# Patient Record
Sex: MALE | Race: WHITE | NOT HISPANIC OR LATINO | Employment: OTHER | URBAN - METROPOLITAN AREA
[De-identification: names, ages, dates, MRNs, and addresses within clinical notes are randomized per-mention and may not be internally consistent; named-entity substitution may affect disease eponyms.]

---

## 2017-08-31 ENCOUNTER — AMBULATORY - HEALTHEAST (OUTPATIENT)
Dept: CARDIOLOGY | Facility: CLINIC | Age: 67
End: 2017-08-31

## 2017-08-31 DIAGNOSIS — I25.10 CAD (CORONARY ARTERY DISEASE): ICD-10-CM

## 2017-08-31 DIAGNOSIS — I77.810 ECTATIC THORACIC AORTA (H): ICD-10-CM

## 2017-09-22 ENCOUNTER — COMMUNICATION - HEALTHEAST (OUTPATIENT)
Dept: ADMINISTRATIVE | Facility: CLINIC | Age: 67
End: 2017-09-22

## 2017-09-26 ENCOUNTER — COMMUNICATION - HEALTHEAST (OUTPATIENT)
Dept: CARDIOLOGY | Facility: CLINIC | Age: 67
End: 2017-09-26

## 2017-09-26 DIAGNOSIS — I77.810 ECTATIC THORACIC AORTA (H): ICD-10-CM

## 2017-09-29 ENCOUNTER — COMMUNICATION - HEALTHEAST (OUTPATIENT)
Dept: TELEHEALTH | Facility: CLINIC | Age: 67
End: 2017-09-29

## 2017-09-29 ENCOUNTER — HOSPITAL ENCOUNTER (OUTPATIENT)
Dept: CT IMAGING | Facility: HOSPITAL | Age: 67
Discharge: HOME | End: 2017-09-29
Attending: INTERNAL MEDICINE

## 2017-09-29 DIAGNOSIS — I77.810 ECTATIC THORACIC AORTA (H): ICD-10-CM

## 2017-10-26 ENCOUNTER — OFFICE VISIT - HEALTHEAST (OUTPATIENT)
Dept: CARDIOLOGY | Facility: CLINIC | Age: 67
End: 2017-10-26

## 2017-10-26 DIAGNOSIS — I25.10 CORONARY ARTERY CALCIFICATION SEEN ON CAT SCAN: ICD-10-CM

## 2017-10-26 DIAGNOSIS — I77.810 AORTIC ECTASIA, THORACIC (H): ICD-10-CM

## 2017-10-26 DIAGNOSIS — I10 ESSENTIAL HYPERTENSION: ICD-10-CM

## 2017-10-26 DIAGNOSIS — E78.00 HYPERCHOLESTEROLEMIA: ICD-10-CM

## 2017-10-26 DIAGNOSIS — I35.1 MILD AORTIC INSUFFICIENCY: ICD-10-CM

## 2017-10-26 ASSESSMENT — MIFFLIN-ST. JEOR: SCORE: 1627.16

## 2017-11-09 ENCOUNTER — RECORDS - HEALTHEAST (OUTPATIENT)
Dept: LAB | Facility: CLINIC | Age: 67
End: 2017-11-09

## 2017-11-09 LAB
CHOLEST SERPL-MCNC: 156 MG/DL
FASTING STATUS PATIENT QL REPORTED: NO
HDLC SERPL-MCNC: 56 MG/DL
LDLC SERPL CALC-MCNC: 82 MG/DL
TRIGL SERPL-MCNC: 89 MG/DL

## 2018-04-02 ENCOUNTER — RECORDS - HEALTHEAST (OUTPATIENT)
Dept: LAB | Facility: CLINIC | Age: 68
End: 2018-04-02

## 2018-04-02 LAB
ANION GAP SERPL CALCULATED.3IONS-SCNC: 11 MMOL/L (ref 5–18)
BUN SERPL-MCNC: 24 MG/DL (ref 8–22)
CALCIUM SERPL-MCNC: 8.9 MG/DL (ref 8.5–10.5)
CHLORIDE BLD-SCNC: 106 MMOL/L (ref 98–107)
CO2 SERPL-SCNC: 24 MMOL/L (ref 22–31)
CREAT SERPL-MCNC: 1.06 MG/DL (ref 0.7–1.3)
GFR SERPL CREATININE-BSD FRML MDRD: >60 ML/MIN/1.73M2
GLUCOSE BLD-MCNC: 95 MG/DL (ref 70–125)
POTASSIUM BLD-SCNC: 3.7 MMOL/L (ref 3.5–5)
SODIUM SERPL-SCNC: 141 MMOL/L (ref 136–145)

## 2018-05-09 ENCOUNTER — RECORDS - HEALTHEAST (OUTPATIENT)
Dept: LAB | Facility: CLINIC | Age: 68
End: 2018-05-09

## 2018-06-01 LAB — BACTERIA SPEC CULT: ABNORMAL

## 2018-09-19 ENCOUNTER — AMBULATORY - HEALTHEAST (OUTPATIENT)
Dept: CARDIOLOGY | Facility: CLINIC | Age: 68
End: 2018-09-19

## 2018-09-19 DIAGNOSIS — I77.810 ECTATIC THORACIC AORTA (H): ICD-10-CM

## 2018-10-03 ENCOUNTER — HOSPITAL ENCOUNTER (OUTPATIENT)
Dept: CT IMAGING | Facility: HOSPITAL | Age: 68
Discharge: HOME | End: 2018-10-03
Attending: INTERNAL MEDICINE

## 2018-10-03 DIAGNOSIS — I77.810 ECTATIC THORACIC AORTA (H): ICD-10-CM

## 2018-10-03 LAB
CREAT BLD-MCNC: 1.1 MG/DL
POC GFR AMER AF HE - HISTORICAL: >60 ML/MIN/1.73M2
POC GFR NON AMER AF HE - HISTORICAL: >60 ML/MIN/1.73M2

## 2018-10-10 ENCOUNTER — AMBULATORY - HEALTHEAST (OUTPATIENT)
Dept: CARDIOLOGY | Facility: CLINIC | Age: 68
End: 2018-10-10

## 2018-10-10 DIAGNOSIS — I71.21 ASCENDING AORTIC ANEURYSM (H): ICD-10-CM

## 2018-11-29 ENCOUNTER — RECORDS - HEALTHEAST (OUTPATIENT)
Dept: ADMINISTRATIVE | Facility: OTHER | Age: 68
End: 2018-11-29

## 2018-12-03 ENCOUNTER — OFFICE VISIT - HEALTHEAST (OUTPATIENT)
Dept: CARDIOLOGY | Facility: CLINIC | Age: 68
End: 2018-12-03

## 2018-12-03 DIAGNOSIS — E78.00 HYPERCHOLESTEROLEMIA: ICD-10-CM

## 2018-12-03 DIAGNOSIS — I25.10 CORONARY ARTERY CALCIFICATION SEEN ON CAT SCAN: ICD-10-CM

## 2018-12-03 DIAGNOSIS — I10 ESSENTIAL HYPERTENSION: ICD-10-CM

## 2018-12-03 DIAGNOSIS — I77.810 AORTIC ECTASIA, THORACIC (H): ICD-10-CM

## 2018-12-03 LAB
CHOLEST SERPL-MCNC: 147 MG/DL
FASTING STATUS PATIENT QL REPORTED: ABNORMAL
HDLC SERPL-MCNC: 55 MG/DL
LDLC SERPL CALC-MCNC: 60 MG/DL
TRIGL SERPL-MCNC: 162 MG/DL

## 2018-12-03 ASSESSMENT — MIFFLIN-ST. JEOR: SCORE: 1640.76

## 2019-04-17 ENCOUNTER — RECORDS - HEALTHEAST (OUTPATIENT)
Dept: ADMINISTRATIVE | Facility: OTHER | Age: 69
End: 2019-04-17

## 2019-04-17 ENCOUNTER — RECORDS - HEALTHEAST (OUTPATIENT)
Dept: LAB | Facility: CLINIC | Age: 69
End: 2019-04-17

## 2019-04-17 LAB
ALBUMIN SERPL-MCNC: 4.1 G/DL (ref 3.5–5)
ALP SERPL-CCNC: 61 U/L (ref 45–120)
ALT SERPL W P-5'-P-CCNC: 24 U/L (ref 0–45)
ANION GAP SERPL CALCULATED.3IONS-SCNC: 10 MMOL/L (ref 5–18)
AST SERPL W P-5'-P-CCNC: 26 U/L (ref 0–40)
BILIRUB SERPL-MCNC: 1.3 MG/DL (ref 0–1)
BUN SERPL-MCNC: 17 MG/DL (ref 8–28)
CALCIUM SERPL-MCNC: 9.6 MG/DL (ref 8.5–10.5)
CHLORIDE BLD-SCNC: 104 MMOL/L (ref 98–107)
CHOLEST SERPL-MCNC: 146 MG/DL
CO2 SERPL-SCNC: 26 MMOL/L (ref 22–31)
CREAT SERPL-MCNC: 1 MG/DL (ref 0.7–1.3)
FASTING STATUS PATIENT QL REPORTED: YES
GFR SERPL CREATININE-BSD FRML MDRD: >60 ML/MIN/1.73M2
GLUCOSE BLD-MCNC: 90 MG/DL (ref 70–125)
HDLC SERPL-MCNC: 61 MG/DL
LDLC SERPL CALC-MCNC: 68 MG/DL
POTASSIUM BLD-SCNC: 3.5 MMOL/L (ref 3.5–5)
PROT SERPL-MCNC: 6.7 G/DL (ref 6–8)
SODIUM SERPL-SCNC: 140 MMOL/L (ref 136–145)
TRIGL SERPL-MCNC: 85 MG/DL

## 2019-07-21 ENCOUNTER — RECORDS - HEALTHEAST (OUTPATIENT)
Dept: ADMINISTRATIVE | Facility: OTHER | Age: 69
End: 2019-07-21

## 2019-07-29 ENCOUNTER — RECORDS - HEALTHEAST (OUTPATIENT)
Dept: ADMINISTRATIVE | Facility: OTHER | Age: 69
End: 2019-07-29

## 2019-09-09 ENCOUNTER — RECORDS - HEALTHEAST (OUTPATIENT)
Dept: ADMINISTRATIVE | Facility: OTHER | Age: 69
End: 2019-09-09

## 2019-11-19 ENCOUNTER — RECORDS - HEALTHEAST (OUTPATIENT)
Dept: ADMINISTRATIVE | Facility: OTHER | Age: 69
End: 2019-11-19

## 2019-11-19 ENCOUNTER — RECORDS - HEALTHEAST (OUTPATIENT)
Dept: LAB | Facility: CLINIC | Age: 69
End: 2019-11-19

## 2019-11-19 LAB
ANION GAP SERPL CALCULATED.3IONS-SCNC: 8 MMOL/L (ref 5–18)
BUN SERPL-MCNC: 17 MG/DL (ref 8–28)
CALCIUM SERPL-MCNC: 9.4 MG/DL (ref 8.5–10.5)
CHLORIDE BLD-SCNC: 103 MMOL/L (ref 98–107)
CO2 SERPL-SCNC: 30 MMOL/L (ref 22–31)
CREAT SERPL-MCNC: 1.09 MG/DL (ref 0.7–1.3)
GFR SERPL CREATININE-BSD FRML MDRD: >60 ML/MIN/1.73M2
GLUCOSE BLD-MCNC: 156 MG/DL (ref 70–125)
POTASSIUM BLD-SCNC: 3.2 MMOL/L (ref 3.5–5)
SODIUM SERPL-SCNC: 141 MMOL/L (ref 136–145)

## 2019-12-04 ENCOUNTER — COMMUNICATION - HEALTHEAST (OUTPATIENT)
Dept: CARDIOLOGY | Facility: CLINIC | Age: 69
End: 2019-12-04

## 2019-12-04 DIAGNOSIS — I35.9 AORTIC VALVE DISEASE: ICD-10-CM

## 2019-12-04 DIAGNOSIS — I77.810 AORTIC ECTASIA, THORACIC (H): ICD-10-CM

## 2019-12-17 ENCOUNTER — HOSPITAL ENCOUNTER (OUTPATIENT)
Dept: CT IMAGING | Facility: HOSPITAL | Age: 69
Discharge: HOME | End: 2019-12-17
Attending: INTERNAL MEDICINE

## 2019-12-17 DIAGNOSIS — I77.810 AORTIC ECTASIA, THORACIC (H): ICD-10-CM

## 2019-12-17 DIAGNOSIS — I35.9 AORTIC VALVE DISEASE: ICD-10-CM

## 2019-12-31 ENCOUNTER — RECORDS - HEALTHEAST (OUTPATIENT)
Dept: LAB | Facility: CLINIC | Age: 69
End: 2019-12-31

## 2020-01-03 LAB
BACTERIA SPEC CULT: ABNORMAL
BACTERIA SPEC CULT: NORMAL
GRAM STAIN RESULT: NORMAL
GRAM STAIN RESULT: NORMAL

## 2020-01-07 ENCOUNTER — AMBULATORY - HEALTHEAST (OUTPATIENT)
Dept: CARDIOLOGY | Facility: CLINIC | Age: 70
End: 2020-01-07

## 2020-01-07 ENCOUNTER — RECORDS - HEALTHEAST (OUTPATIENT)
Dept: ADMINISTRATIVE | Facility: OTHER | Age: 70
End: 2020-01-07

## 2020-01-22 ENCOUNTER — OFFICE VISIT - HEALTHEAST (OUTPATIENT)
Dept: CARDIOLOGY | Facility: CLINIC | Age: 70
End: 2020-01-22

## 2020-01-22 DIAGNOSIS — I77.810 AORTIC ECTASIA, THORACIC (H): ICD-10-CM

## 2020-01-22 DIAGNOSIS — E78.00 HYPERCHOLESTEROLEMIA: ICD-10-CM

## 2020-01-22 DIAGNOSIS — I35.1 MILD AORTIC INSUFFICIENCY: ICD-10-CM

## 2020-01-22 DIAGNOSIS — I10 ESSENTIAL HYPERTENSION: ICD-10-CM

## 2020-01-22 DIAGNOSIS — I25.10 CORONARY ARTERY CALCIFICATION SEEN ON CAT SCAN: ICD-10-CM

## 2020-01-22 ASSESSMENT — MIFFLIN-ST. JEOR: SCORE: 1654.37

## 2020-06-03 ENCOUNTER — AMBULATORY - HEALTHEAST (OUTPATIENT)
Dept: VASCULAR SURGERY | Facility: CLINIC | Age: 70
End: 2020-06-03

## 2020-06-03 DIAGNOSIS — M79.89 LEG SWELLING: ICD-10-CM

## 2020-06-29 ENCOUNTER — RECORDS - HEALTHEAST (OUTPATIENT)
Dept: LAB | Facility: CLINIC | Age: 70
End: 2020-06-29

## 2020-06-29 LAB
ALBUMIN SERPL-MCNC: 3.9 G/DL (ref 3.5–5)
ALP SERPL-CCNC: 68 U/L (ref 45–120)
ALT SERPL W P-5'-P-CCNC: 27 U/L (ref 0–45)
ANION GAP SERPL CALCULATED.3IONS-SCNC: 11 MMOL/L (ref 5–18)
AST SERPL W P-5'-P-CCNC: 23 U/L (ref 0–40)
BILIRUB SERPL-MCNC: 0.9 MG/DL (ref 0–1)
BUN SERPL-MCNC: 20 MG/DL (ref 8–28)
CALCIUM SERPL-MCNC: 9.6 MG/DL (ref 8.5–10.5)
CHLORIDE BLD-SCNC: 104 MMOL/L (ref 98–107)
CO2 SERPL-SCNC: 28 MMOL/L (ref 22–31)
CREAT SERPL-MCNC: 1 MG/DL (ref 0.7–1.3)
GFR SERPL CREATININE-BSD FRML MDRD: >60 ML/MIN/1.73M2
GLUCOSE BLD-MCNC: 104 MG/DL (ref 70–125)
POTASSIUM BLD-SCNC: 3.2 MMOL/L (ref 3.5–5)
PROT SERPL-MCNC: 6.5 G/DL (ref 6–8)
SODIUM SERPL-SCNC: 143 MMOL/L (ref 136–145)

## 2020-06-30 ENCOUNTER — OFFICE VISIT - HEALTHEAST (OUTPATIENT)
Dept: VASCULAR SURGERY | Facility: CLINIC | Age: 70
End: 2020-06-30

## 2020-06-30 ENCOUNTER — COMMUNICATION - HEALTHEAST (OUTPATIENT)
Dept: VASCULAR SURGERY | Facility: CLINIC | Age: 70
End: 2020-06-30

## 2020-06-30 DIAGNOSIS — R59.0 INGUINAL LYMPHADENOPATHY: ICD-10-CM

## 2020-06-30 DIAGNOSIS — L90.5 SCAR CONDITION AND FIBROSIS OF SKIN: ICD-10-CM

## 2020-06-30 DIAGNOSIS — I87.303 VENOUS HYPERTENSION OF LOWER EXTREMITY, BILATERAL: ICD-10-CM

## 2020-06-30 DIAGNOSIS — I89.0 ACQUIRED LYMPHEDEMA OF LEG: ICD-10-CM

## 2020-06-30 DIAGNOSIS — C61 PROSTATE CANCER (H): ICD-10-CM

## 2020-06-30 DIAGNOSIS — M79.89 LEG SWELLING: ICD-10-CM

## 2020-06-30 DIAGNOSIS — Q66.70 HIGH ARCHES, CONGENITAL: ICD-10-CM

## 2020-07-06 ENCOUNTER — COMMUNICATION - HEALTHEAST (OUTPATIENT)
Dept: VASCULAR SURGERY | Facility: CLINIC | Age: 70
End: 2020-07-06

## 2020-07-08 ENCOUNTER — COMMUNICATION - HEALTHEAST (OUTPATIENT)
Dept: OTHER | Facility: CLINIC | Age: 70
End: 2020-07-08

## 2020-07-08 ENCOUNTER — AMBULATORY - HEALTHEAST (OUTPATIENT)
Dept: VASCULAR SURGERY | Facility: CLINIC | Age: 70
End: 2020-07-08

## 2020-07-08 ENCOUNTER — RECORDS - HEALTHEAST (OUTPATIENT)
Dept: VASCULAR ULTRASOUND | Facility: CLINIC | Age: 70
End: 2020-07-08

## 2020-07-08 DIAGNOSIS — Q66.70 CONGENITAL CAVUS DEFORMITY OF FOOT: ICD-10-CM

## 2020-07-08 DIAGNOSIS — C61 PROSTATE CANCER (H): ICD-10-CM

## 2020-07-08 DIAGNOSIS — L90.5 SCAR CONDITION AND FIBROSIS OF SKIN: ICD-10-CM

## 2020-07-08 DIAGNOSIS — R59.0 INGUINAL LYMPHADENOPATHY: ICD-10-CM

## 2020-07-08 DIAGNOSIS — I87.303 VENOUS HYPERTENSION OF LOWER EXTREMITY, BILATERAL: ICD-10-CM

## 2020-07-08 DIAGNOSIS — L90.5 SCAR CONDITIONS AND FIBROSIS OF SKIN: ICD-10-CM

## 2020-07-08 DIAGNOSIS — R59.0 LOCALIZED ENLARGED LYMPH NODES: ICD-10-CM

## 2020-07-08 DIAGNOSIS — C61 MALIGNANT NEOPLASM OF PROSTATE (H): ICD-10-CM

## 2020-07-08 DIAGNOSIS — I89.0 ACQUIRED LYMPHEDEMA OF LEG: ICD-10-CM

## 2020-07-08 DIAGNOSIS — I87.303 CHRONIC VENOUS HYPERTENSION (IDIOPATHIC) WITHOUT COMPLICATIONS OF BILATERAL LOWER EXTREMITY: ICD-10-CM

## 2020-07-08 DIAGNOSIS — M79.89 OTHER SPECIFIED SOFT TISSUE DISORDERS: ICD-10-CM

## 2020-07-08 DIAGNOSIS — I89.0 LYMPHEDEMA, NOT ELSEWHERE CLASSIFIED: ICD-10-CM

## 2020-07-08 DIAGNOSIS — M79.89 LEG SWELLING: ICD-10-CM

## 2020-07-08 DIAGNOSIS — Q66.70 HIGH ARCHES, CONGENITAL: ICD-10-CM

## 2020-07-09 ENCOUNTER — COMMUNICATION - HEALTHEAST (OUTPATIENT)
Dept: VASCULAR SURGERY | Facility: CLINIC | Age: 70
End: 2020-07-09

## 2020-07-15 ENCOUNTER — COMMUNICATION - HEALTHEAST (OUTPATIENT)
Dept: VASCULAR SURGERY | Facility: CLINIC | Age: 70
End: 2020-07-15

## 2020-07-22 ENCOUNTER — OFFICE VISIT - HEALTHEAST (OUTPATIENT)
Dept: PHYSICAL THERAPY | Facility: REHABILITATION | Age: 70
End: 2020-07-22

## 2020-07-22 DIAGNOSIS — I89.0 ACQUIRED LYMPHEDEMA OF LEG: ICD-10-CM

## 2020-07-22 DIAGNOSIS — I87.303 VENOUS HYPERTENSION OF LOWER EXTREMITY, BILATERAL: ICD-10-CM

## 2020-07-22 DIAGNOSIS — C61 PROSTATE CANCER (H): ICD-10-CM

## 2020-07-22 DIAGNOSIS — L90.5 SCAR CONDITION AND FIBROSIS OF SKIN: ICD-10-CM

## 2020-07-23 ENCOUNTER — OFFICE VISIT - HEALTHEAST (OUTPATIENT)
Dept: PHYSICAL THERAPY | Facility: REHABILITATION | Age: 70
End: 2020-07-23

## 2020-07-23 DIAGNOSIS — L90.5 SCAR CONDITION AND FIBROSIS OF SKIN: ICD-10-CM

## 2020-07-23 DIAGNOSIS — I87.303 VENOUS HYPERTENSION OF LOWER EXTREMITY, BILATERAL: ICD-10-CM

## 2020-07-23 DIAGNOSIS — C61 PROSTATE CANCER (H): ICD-10-CM

## 2020-07-23 DIAGNOSIS — I89.0 ACQUIRED LYMPHEDEMA OF LEG: ICD-10-CM

## 2020-07-29 ENCOUNTER — OFFICE VISIT - HEALTHEAST (OUTPATIENT)
Dept: PHYSICAL THERAPY | Facility: REHABILITATION | Age: 70
End: 2020-07-29

## 2020-07-29 DIAGNOSIS — I89.0 ACQUIRED LYMPHEDEMA OF LEG: ICD-10-CM

## 2020-07-29 DIAGNOSIS — L90.5 SCAR CONDITION AND FIBROSIS OF SKIN: ICD-10-CM

## 2020-07-29 DIAGNOSIS — I87.303 VENOUS HYPERTENSION OF LOWER EXTREMITY, BILATERAL: ICD-10-CM

## 2020-07-29 DIAGNOSIS — C61 PROSTATE CANCER (H): ICD-10-CM

## 2020-08-05 ENCOUNTER — OFFICE VISIT - HEALTHEAST (OUTPATIENT)
Dept: PHYSICAL THERAPY | Facility: REHABILITATION | Age: 70
End: 2020-08-05

## 2020-08-05 ENCOUNTER — AMBULATORY - HEALTHEAST (OUTPATIENT)
Dept: OTHER | Facility: CLINIC | Age: 70
End: 2020-08-05

## 2020-08-05 DIAGNOSIS — C61 PROSTATE CANCER (H): ICD-10-CM

## 2020-08-05 DIAGNOSIS — I87.303 VENOUS HYPERTENSION OF LOWER EXTREMITY, BILATERAL: ICD-10-CM

## 2020-08-05 DIAGNOSIS — I89.0 ACQUIRED LYMPHEDEMA OF LEG: ICD-10-CM

## 2020-08-05 DIAGNOSIS — L90.5 SCAR CONDITION AND FIBROSIS OF SKIN: ICD-10-CM

## 2020-08-11 ENCOUNTER — OFFICE VISIT - HEALTHEAST (OUTPATIENT)
Dept: PHYSICAL THERAPY | Facility: REHABILITATION | Age: 70
End: 2020-08-11

## 2020-08-11 DIAGNOSIS — I87.303 VENOUS HYPERTENSION OF LOWER EXTREMITY, BILATERAL: ICD-10-CM

## 2020-08-11 DIAGNOSIS — I89.0 ACQUIRED LYMPHEDEMA OF LEG: ICD-10-CM

## 2020-08-11 DIAGNOSIS — L90.5 SCAR CONDITION AND FIBROSIS OF SKIN: ICD-10-CM

## 2020-08-11 DIAGNOSIS — C61 PROSTATE CANCER (H): ICD-10-CM

## 2020-08-13 ENCOUNTER — RECORDS - HEALTHEAST (OUTPATIENT)
Dept: LAB | Facility: CLINIC | Age: 70
End: 2020-08-13

## 2020-08-13 LAB
ANION GAP SERPL CALCULATED.3IONS-SCNC: 9 MMOL/L (ref 5–18)
BUN SERPL-MCNC: 15 MG/DL (ref 8–28)
CALCIUM SERPL-MCNC: 9.2 MG/DL (ref 8.5–10.5)
CHLORIDE BLD-SCNC: 106 MMOL/L (ref 98–107)
CO2 SERPL-SCNC: 27 MMOL/L (ref 22–31)
CREAT SERPL-MCNC: 0.93 MG/DL (ref 0.7–1.3)
GFR SERPL CREATININE-BSD FRML MDRD: >60 ML/MIN/1.73M2
GLUCOSE BLD-MCNC: 104 MG/DL (ref 70–125)
POTASSIUM BLD-SCNC: 3.9 MMOL/L (ref 3.5–5)
SODIUM SERPL-SCNC: 142 MMOL/L (ref 136–145)

## 2020-10-01 ENCOUNTER — OFFICE VISIT - HEALTHEAST (OUTPATIENT)
Dept: VASCULAR SURGERY | Facility: CLINIC | Age: 70
End: 2020-10-01

## 2020-10-01 DIAGNOSIS — I89.0 ACQUIRED LYMPHEDEMA OF LEG: ICD-10-CM

## 2020-10-01 DIAGNOSIS — M79.89 LEG SWELLING: ICD-10-CM

## 2020-10-01 DIAGNOSIS — C61 PROSTATE CANCER (H): ICD-10-CM

## 2020-10-01 DIAGNOSIS — L90.5 SCAR CONDITION AND FIBROSIS OF SKIN: ICD-10-CM

## 2020-10-01 RX ORDER — MELOXICAM 7.5 MG/1
1 TABLET ORAL DAILY
Status: SHIPPED | COMMUNITY
Start: 2020-07-28 | End: 2022-04-19

## 2020-10-01 RX ORDER — AMLODIPINE AND OLMESARTAN MEDOXOMIL 10; 40 MG/1; MG/1
1 TABLET ORAL DAILY
Status: SHIPPED | COMMUNITY
Start: 2020-09-07

## 2020-10-01 ASSESSMENT — MIFFLIN-ST. JEOR: SCORE: 1627.16

## 2020-12-17 ENCOUNTER — AMBULATORY - HEALTHEAST (OUTPATIENT)
Dept: CARDIOLOGY | Facility: CLINIC | Age: 70
End: 2020-12-17

## 2020-12-17 DIAGNOSIS — I35.9 AORTIC VALVE DISEASE: ICD-10-CM

## 2020-12-17 DIAGNOSIS — I77.810 AORTIC ECTASIA, THORACIC (H): ICD-10-CM

## 2021-01-11 ENCOUNTER — HOSPITAL ENCOUNTER (OUTPATIENT)
Dept: CT IMAGING | Facility: HOSPITAL | Age: 71
Discharge: HOME | End: 2021-01-11
Attending: INTERNAL MEDICINE

## 2021-01-11 DIAGNOSIS — I35.9 AORTIC VALVE DISEASE: ICD-10-CM

## 2021-01-11 DIAGNOSIS — I77.810 AORTIC ECTASIA, THORACIC (H): ICD-10-CM

## 2021-01-11 LAB
CREAT BLD-MCNC: 1.1 MG/DL (ref 0.7–1.3)
GFR SERPL CREATININE-BSD FRML MDRD: >60 ML/MIN/1.73M2

## 2021-01-20 ENCOUNTER — AMBULATORY - HEALTHEAST (OUTPATIENT)
Dept: CARDIOLOGY | Facility: CLINIC | Age: 71
End: 2021-01-20

## 2021-01-20 ENCOUNTER — RECORDS - HEALTHEAST (OUTPATIENT)
Dept: ADMINISTRATIVE | Facility: OTHER | Age: 71
End: 2021-01-20

## 2021-01-22 ENCOUNTER — HOSPITAL ENCOUNTER (OUTPATIENT)
Dept: CARDIOLOGY | Facility: CLINIC | Age: 71
Discharge: HOME | End: 2021-01-22
Attending: INTERNAL MEDICINE

## 2021-01-22 DIAGNOSIS — I77.810 AORTIC ECTASIA, THORACIC (H): ICD-10-CM

## 2021-01-22 DIAGNOSIS — I35.9 AORTIC VALVE DISEASE: ICD-10-CM

## 2021-01-22 LAB
AORTIC ROOT: 3.9 CM
AORTIC VALVE MEAN VELOCITY: 94.5 CM/S
ASCENDING AORTA: 3.8 CM
AV DIMENSIONLESS INDEX VTI: 0.7
AV MEAN GRADIENT: 4 MMHG
AV PEAK GRADIENT: 7.5 MMHG
AV VALVE AREA: 2.6 CM2
AV VELOCITY RATIO: 0.8
BSA FOR ECHO PROCEDURE: 2.07 M2
CV BLOOD PRESSURE: ABNORMAL MMHG
CV ECHO HEIGHT: 70 IN
CV ECHO WEIGHT: 192 LBS
DOP CALC AO PEAK VEL: 137 CM/S
DOP CALC AO VTI: 28.6 CM
DOP CALC LVOT AREA: 3.8 CM2
DOP CALC LVOT DIAMETER: 2.2 CM
DOP CALC LVOT PEAK VEL: 115 CM/S
DOP CALC LVOT STROKE VOLUME: 75.6 CM3
DOP CALCLVOT PEAK VEL VTI: 19.9 CM
EJECTION FRACTION: 64 % (ref 55–75)
FRACTIONAL SHORTENING: 41.8 % (ref 28–44)
INTERVENTRICULAR SEPTUM IN END DIASTOLE: 1.57 CM (ref 0.6–1)
IVS/PW RATIO: 1.4
LA AREA 1: 15 CM2
LA AREA 2: 11 CM2
LEFT ATRIUM LENGTH: 3.7 CM
LEFT ATRIUM SIZE: 3.8 CM
LEFT ATRIUM TO AORTIC ROOT RATIO: 0.97 NO UNITS
LEFT ATRIUM VOLUME INDEX: 18.3 ML/M2
LEFT ATRIUM VOLUME: 37.9 ML
LEFT VENTRICLE CARDIAC INDEX: 2.1 L/MIN/M2
LEFT VENTRICLE CARDIAC OUTPUT: 4.4 L/MIN
LEFT VENTRICLE DIASTOLIC VOLUME INDEX: 64.3 CM3/M2 (ref 34–74)
LEFT VENTRICLE DIASTOLIC VOLUME: 133 CM3 (ref 62–150)
LEFT VENTRICLE HEART RATE: 58 BPM
LEFT VENTRICLE MASS INDEX: 101.2 G/M2
LEFT VENTRICLE SYSTOLIC VOLUME INDEX: 23 CM3/M2 (ref 11–31)
LEFT VENTRICLE SYSTOLIC VOLUME: 47.7 CM3 (ref 21–61)
LEFT VENTRICULAR INTERNAL DIMENSION IN DIASTOLE: 4.21 CM (ref 4.2–5.8)
LEFT VENTRICULAR INTERNAL DIMENSION IN SYSTOLE: 2.45 CM (ref 2.5–4)
LEFT VENTRICULAR MASS: 209.5 G
LEFT VENTRICULAR OUTFLOW TRACT MEAN GRADIENT: 2 MMHG
LEFT VENTRICULAR OUTFLOW TRACT MEAN VELOCITY: 60 CM/S
LEFT VENTRICULAR OUTFLOW TRACT PEAK GRADIENT: 5 MMHG
LEFT VENTRICULAR POSTERIOR WALL IN END DIASTOLE: 1.1 CM (ref 0.6–1)
LV STROKE VOLUME INDEX: 36.5 ML/M2
MITRAL VALVE E/A RATIO: 0.7
MV AVERAGE E/E' RATIO: 6.7 CM/S
MV DECELERATION TIME: 306 MS
MV E'TISSUE VEL-LAT: 9.57 CM/S
MV E'TISSUE VEL-MED: 7.45 CM/S
MV LATERAL E/E' RATIO: 5.9
MV MEDIAL E/E' RATIO: 7.6
MV PEAK A VELOCITY: 87.5 CM/S
MV PEAK E VELOCITY: 56.9 CM/S
NUC REST DIASTOLIC VOLUME INDEX: 3072 LBS
NUC REST SYSTOLIC VOLUME INDEX: 70 IN
PV MEAN GRADIENT: 4 MMHG
PV MEAN VELOCITY: 97.5 CM/S
PV PEAK GRADIENT: 8 MMHG
PV VELOCITY TIME INTERVAL: 30.7 CM
PV VMAX: 141 CM/S
TRICUSPID REGURGITATION PEAK PRESSURE GRADIENT: 27.5 MMHG
TRICUSPID VALVE ANULAR PLANE SYSTOLIC EXCURSION: 3.2 CM
TRICUSPID VALVE PEAK REGURGITANT VELOCITY: 262 CM/S

## 2021-01-22 ASSESSMENT — MIFFLIN-ST. JEOR: SCORE: 1627.16

## 2021-01-25 ENCOUNTER — COMMUNICATION - HEALTHEAST (OUTPATIENT)
Dept: CARDIOLOGY | Facility: CLINIC | Age: 71
End: 2021-01-25

## 2021-01-26 ENCOUNTER — OFFICE VISIT - HEALTHEAST (OUTPATIENT)
Dept: CARDIOLOGY | Facility: CLINIC | Age: 71
End: 2021-01-26

## 2021-01-26 DIAGNOSIS — I10 ESSENTIAL HYPERTENSION: ICD-10-CM

## 2021-01-26 DIAGNOSIS — I25.10 CORONARY ARTERY CALCIFICATION SEEN ON CAT SCAN: ICD-10-CM

## 2021-01-26 DIAGNOSIS — I35.1 MILD AORTIC INSUFFICIENCY: ICD-10-CM

## 2021-01-26 DIAGNOSIS — I77.810 AORTIC ECTASIA, THORACIC (H): ICD-10-CM

## 2021-01-26 DIAGNOSIS — E78.00 HYPERCHOLESTEROLEMIA: ICD-10-CM

## 2021-01-26 LAB
CHOLEST SERPL-MCNC: 148 MG/DL
FASTING STATUS PATIENT QL REPORTED: NO
HDLC SERPL-MCNC: 59 MG/DL
LDLC SERPL CALC-MCNC: 48 MG/DL
TRIGL SERPL-MCNC: 204 MG/DL

## 2021-01-26 RX ORDER — TERBINAFINE HYDROCHLORIDE 250 MG/1
250 TABLET ORAL
Status: SHIPPED | COMMUNITY
Start: 2021-01-26 | End: 2022-02-15

## 2021-01-26 RX ORDER — VALACYCLOVIR HYDROCHLORIDE 1 G/1
TABLET, FILM COATED ORAL
Status: SHIPPED | COMMUNITY
Start: 2020-10-19 | End: 2022-02-15

## 2021-01-26 ASSESSMENT — MIFFLIN-ST. JEOR: SCORE: 1636.23

## 2021-01-29 ENCOUNTER — COMMUNICATION - HEALTHEAST (OUTPATIENT)
Dept: PHYSICAL THERAPY | Facility: REHABILITATION | Age: 71
End: 2021-01-29

## 2021-04-21 ENCOUNTER — OFFICE VISIT - HEALTHEAST (OUTPATIENT)
Dept: VASCULAR SURGERY | Facility: CLINIC | Age: 71
End: 2021-04-21

## 2021-04-21 DIAGNOSIS — C61 PROSTATE CANCER (H): ICD-10-CM

## 2021-04-21 DIAGNOSIS — Q66.70 HIGH ARCHES, CONGENITAL: ICD-10-CM

## 2021-04-21 DIAGNOSIS — I89.0 ACQUIRED LYMPHEDEMA OF LEG: ICD-10-CM

## 2021-04-21 DIAGNOSIS — I87.303 VENOUS HYPERTENSION OF LOWER EXTREMITY, BILATERAL: ICD-10-CM

## 2021-04-21 DIAGNOSIS — M79.89 LEG SWELLING: ICD-10-CM

## 2021-04-21 ASSESSMENT — MIFFLIN-ST. JEOR: SCORE: 1629.43

## 2021-05-31 VITALS — WEIGHT: 192 LBS | BODY MASS INDEX: 27.49 KG/M2 | HEIGHT: 70 IN

## 2021-06-02 VITALS — HEIGHT: 70 IN | WEIGHT: 195 LBS | BODY MASS INDEX: 27.92 KG/M2

## 2021-06-04 VITALS
HEIGHT: 70 IN | BODY MASS INDEX: 28.35 KG/M2 | HEART RATE: 72 BPM | DIASTOLIC BLOOD PRESSURE: 70 MMHG | OXYGEN SATURATION: 14 % | SYSTOLIC BLOOD PRESSURE: 106 MMHG | WEIGHT: 198 LBS

## 2021-06-04 VITALS
WEIGHT: 189.3 LBS | DIASTOLIC BLOOD PRESSURE: 70 MMHG | OXYGEN SATURATION: 97 % | BODY MASS INDEX: 27.16 KG/M2 | TEMPERATURE: 98.2 F | HEART RATE: 62 BPM | RESPIRATION RATE: 16 BRPM | SYSTOLIC BLOOD PRESSURE: 110 MMHG

## 2021-06-04 NOTE — TELEPHONE ENCOUNTER
===View-only below this line===  ----- Message -----  From: Nayana Gomes MD  Sent: 12/4/2019   2:01 PM CST  To: Gelacio Blanco RN    Yes, CTA chest for aneurysm f/u please.    Thanks,   EG

## 2021-06-04 NOTE — TELEPHONE ENCOUNTER
"Dr. Gomes last OV, 12/3/2018 states, \"Aortic ectasia/aneurysm - CTA in 1 year\". Patient has arranged an OV with you for 1/10/20. Would you like testing completed prior to OV? Please give recommendation. EMARN  "

## 2021-06-04 NOTE — TELEPHONE ENCOUNTER
----- Message from Gaurang Oconnell sent at 12/4/2019 10:01 AM CST -----  Regarding: order needed CTA  General phone call:    Caller: Pt    Primary cardiologist: Dr Hewitt    Detailed reason for call: Pt is to have 1 YR FU - per notes Pt will need testing - perhaps a CTA?    Please place proper order and let me know so we can schedule prior to FU - thank you     New or active symptoms? na  Best phone number: home  Best time to contact: na  Ok to leave a detailed message? na  Device? na    Additional Info:

## 2021-06-05 ENCOUNTER — RECORDS - HEALTHEAST (OUTPATIENT)
Dept: CARDIOLOGY | Facility: CLINIC | Age: 71
End: 2021-06-05

## 2021-06-05 VITALS
RESPIRATION RATE: 20 BRPM | BODY MASS INDEX: 27.49 KG/M2 | DIASTOLIC BLOOD PRESSURE: 82 MMHG | HEIGHT: 70 IN | HEART RATE: 68 BPM | WEIGHT: 192 LBS | SYSTOLIC BLOOD PRESSURE: 118 MMHG | TEMPERATURE: 98.2 F

## 2021-06-05 VITALS
DIASTOLIC BLOOD PRESSURE: 80 MMHG | HEIGHT: 70 IN | SYSTOLIC BLOOD PRESSURE: 130 MMHG | RESPIRATION RATE: 16 BRPM | WEIGHT: 194 LBS | OXYGEN SATURATION: 97 % | HEART RATE: 60 BPM | BODY MASS INDEX: 27.77 KG/M2

## 2021-06-05 VITALS
SYSTOLIC BLOOD PRESSURE: 106 MMHG | DIASTOLIC BLOOD PRESSURE: 80 MMHG | HEART RATE: 68 BPM | TEMPERATURE: 97.9 F | BODY MASS INDEX: 26.46 KG/M2 | WEIGHT: 189 LBS | RESPIRATION RATE: 22 BRPM | HEIGHT: 71 IN

## 2021-06-05 VITALS — BODY MASS INDEX: 27.49 KG/M2 | WEIGHT: 192 LBS | HEIGHT: 70 IN

## 2021-06-05 DIAGNOSIS — I10 ESSENTIAL HYPERTENSION: ICD-10-CM

## 2021-06-05 DIAGNOSIS — I25.10 CORONARY ATHEROSCLEROSIS: ICD-10-CM

## 2021-06-05 NOTE — PATIENT INSTRUCTIONS - HE
- Exercise, consider the exercise bike    - Limit processed foods, sugar and baked goods    - Call if you have shortness of breath, dizziness, swelling outside of your surgery foot    - See me in 1 year with a CT scan and echo at that time    - Cholesterol check with your annual visit this spring

## 2021-06-09 NOTE — PROGRESS NOTES
[facility]  Rehabilitation Daily Progress     Patient Name: Satish Lang  Date: 7/24/2020  Visit #: 2/12  PTA visit #:    Referral Diagnosis: Leg swelling  Referring provider: Suzanne Zelaya, *  Visit Diagnosis:     ICD-10-CM    1. Acquired lymphedema of leg  I89.0    2. Scar condition and fibrosis of skin  L90.5    3. Prostate cancer (H)  C61    4. Venous hypertension of lower extremity, bilateral  I87.303        Diagnoses and all orders for this visit:    Acquired lymphedema of leg    Scar condition and fibrosis of skin    Prostate cancer (H)    Venous hypertension of lower extremity, bilateral       Satish Lang is a 70 y.o. male who presents to therapy today with chief complaints of right > left ankle and lower leg edema that started in May 2020. Patient had an achilles tendon tear in November and then under went a repair in December 2019 in which he developed an infection. The patient has hx of prostate cancer in 2014 in which he had surgery and was told the lymph nodes were clear and he did not have to have chemo or radiation treatments. Patient demonstrates a 3.75% increase in volume on the right side but does demonstrate some edema at the ankle on the left side. He also has a hx of an achilles tendon tear on the left side that was not repaired but was in a boot to heal. Patient will benefit from skilled therapy to decrease edema, decrease scar tissue and decrease risk for infections.      Impairments in  ROM, joint mobility, strength, gait/locomotion and balance, swelling  Assessment:      Pt reports great visible reduction in R ankle swelling with just 24 hours of bandaging and he wore it while mowing the lawn.    HEP/POC compliance is  good .  Patient is benefitting from skilled physical therapy and is making steady progress toward functional goals.  Patient is appropriate to continue with skilled physical therapy intervention, as indicated by initial plan of care.     Therapy  interventions being performed are medically necessary, are being delivered according to accepted standards of medical practice, and require the skills of a therapist to perform the services.      Goal Status:    Patient will have a decreased volume in : bilateral;LE;by 5%;to decrease risk of infection;for ease of movement;in 6 weeks - IMPROVING    Patient will have decreased fibrosis, scar tissue for improved lymphatic mobilitiy : in 6 weeks - IMPROVING    Patient will perform, verbalize self-management of: skin care;self-monitoring;exercise;massage;compression wear;compression care;infection prevention;in 6 weeks - IMPROVING      Plan / Patient Education:     Continue with initial plan of care.    Subjective:     Pain Ratin  Pt reports much less swelling with R ankle today after wearing compression bandages last night and during mowing today.    Pt feels like L lateral malleoli seems a little swollen today but maybe just looks that way because R ankle seems so much smaller.    Patient Outcome Measures:  Lymphedema Life Impact Score (%): 19     Scores range from %, where a score of 20% represents the least impact on the individual's life (minimal physical, psychosocial and functional concerns). FROM INITIAL    Objective:     Caregiver present: No    Observation of swelling: R ankle is better.     Volume measurements taken:  No    Skin condition is:  better, scar tissue medial R ankle moderate with moderate adhesions     Compression:  Tolerance to compression is  good .  No compression. Last worn taken off just before appointment.    Medication for infection:  No    Treatment Today      TREATMENT MINUTES COMMENTS   Evaluation     Self-care/ Home management     Manual therapy 45 Manual therapy: manual lymph drainage for right lower extremity lymphedema  Deep abdominal breath, neck effleurage, short neck, shoulder collectors, bilateral axilla, bilateral inguinal, anterior inguinal to axilla anastomosis on  right side, right lower extremity evacuation, abdomen, neck effleurage.    Medical compression bandages to right LE   - tetra- size F from foot to knee  comprilan short stretch 10cm x 5m foot to above ankle with X pattern at ankle and then 2nd 10 x 5 from ankle to knee - pt given written instructions for self-wrapping ideally 1x/day     Neuromuscular Re-education     Therapeutic Activity     Therapeutic Exercises 15 Educated pt on lymph ROM program, performed per pt instructions and printed AVS for home.  Exercises:  Exercise #1: Lymph LE ROM program x5-10 reps 1-2x/day - LE section in standing     Gait training     Modality__________________                Total 60    Blank areas are intentional and mean the treatment did not include these items.       Sunitha Erazo PT, DPT, OCS, CLT  7/24/2020

## 2021-06-09 NOTE — PROGRESS NOTES
[facility] Certification Request    July 22, 2020      Patient: Satish Lang  MR Number: [id number]  YOB: 1950  Date of Visit: 7/22/2020      Dear Dr. Zelaya, Suzanne Foy, *:    Thank you for this referral.   We are seeing Satish Lang in Physical Therapyfor  B LE lymphedema .    Medicare and/or Medicaid requires physician review and approval of the treatment plan. Please review the plan of care and verify that you agree with the therapy plan of care by co-signing this note.      Plan of Care  Authorization / Certification Start Date: 07/22/20  Authorization / Certification End Date: 10/20/20  Authorization / Certification Number of Visits: 10  Communication with: Referral Source  Patient Related Instruction: Nature of Condition;Treatment plan and rationale;Self Care instruction;Basis of treatment;Body mechanics;Posture;Precautions;Next steps;Expected outcome  Times per Week: 2  Number of Weeks: 4-5  Number of Visits: 10  Therapeutic Exercise: ROM;Stretching;Strengthening;Lymphedema  Neuromuscular Reeducation: kinesio tape;core;balance/proprioception  Manual Therapy: myofascial release;soft tissue mobilization;joint mobilization;lymphatic drainage massage  Gait Training: .  Equipment: theraband;compression bandages      Goals:  No data recorded  Patient will have a decreased volume in : bilateral;LE;by 5%;to decrease risk of infection;for ease of movement;in 6 weeks  Patient will have decreased fibrosis, scar tissue for improved lymphatic mobilitiy : in 6 weeks  Patient will perform, verbalize self-management of: skin care;self-monitoring;exercise;massage;compression wear;compression care;infection prevention;in 6 weeks        If you have any questions or concerns, please don't hesitate to call.    Sincerely,      Nayeli Goodwin, PT, DPT        Physician recommendation:     __X_ Follow therapist's recommendation        ___ Modify therapy      *Physician co-signature  indicates they certify the need for these services furnished within this plan and while under their care.    [facility]   Lymphedema Initial Evaluation      Patient Name: Satish Lang  Date of evaluation: 7/22/2020  Referral Diagnosis: Leg swelling  Referring provider: Suzanne Zelaya, *  Visit Diagnosis:     ICD-10-CM    1. Acquired lymphedema of leg  I89.0    2. Scar condition and fibrosis of skin  L90.5    3. Prostate cancer (H)  C61    4. Venous hypertension of lower extremity, bilateral  I87.303        Assessment:     Satish Lang is a 70 y.o. male who presents to therapy today with chief complaints of right > left ankle and lower leg edema that started in May 2020. Patient had an achilles tendon tear in November and then under went a repair in December 2019 in which he developed an infection. The patient has hx of prostate cancer in 2014 in which he had surgery and was told the lymph nodes were clear and he did not have to have chemo or radiation treatments. Patient demonstrates a 3.75% increase in volume on the right side but does demonstrate some edema at the ankle on the left side. He also has a hx of an achilles tendon tear on the left side that was not repaired but was in a boot to heal. Patient will benefit from skilled therapy to decrease edema, decrease scar tissue and decrease risk for infections.     Impairments in  ROM, joint mobility, strength, gait/locomotion and balance, swelling  The POC is dynamic and will be modified on an ongoing basis.  Barriers to achieving goals as noted in the assessment section may affect outcome.  Prognosis to achieve goals is  good   Pt. is appropriate for skilled PT intervention as outlined in the Plan of Care (POC).  Pt. is a good candidate for skilled PT services to improve pain levels and function.  Pt. would be a good candidate for edema management and to develop a home management program.    Goals:   No data recorded  Patient  will have a decreased volume in : bilateral;LE;by 5%;to decrease risk of infection;for ease of movement;in 6 weeks  Patient will have decreased fibrosis, scar tissue for improved lymphatic mobilitiy : in 6 weeks  Patient will perform, verbalize self-management of: skin care;self-monitoring;exercise;massage;compression wear;compression care;infection prevention;in 6 weeks      Patient's expectations/goals are realistic.    Barriers to Learning or Achieving Goals:  Chronicity of the problem.  Co-morbidities or other medical factors.  .    Lymphedema Category:  IV - Stage 1 with Mod-High Functional Demand       Plan / Patient Instructions:      Plan of Care:   Authorization / Certification Start Date: 07/22/20  Authorization / Certification End Date: 10/20/20  Authorization / Certification Number of Visits: 10  Communication with: Referral Source  Patient Related Instruction: Nature of Condition;Treatment plan and rationale;Self Care instruction;Basis of treatment;Body mechanics;Posture;Precautions;Next steps;Expected outcome  Times per Week: 2  Number of Weeks: 4-5  Number of Visits: 10  Therapeutic Exercise: ROM;Stretching;Strengthening;Lymphedema  Neuromuscular Reeducation: kinesio tape;core;balance/proprioception  Manual Therapy: myofascial release;soft tissue mobilization;joint mobilization;lymphatic drainage massage  Gait Training: .  Equipment: theraband;compression bandages      Plan for next visit: assess compression tolerance and make changes as needed (add foam or elastic gel to scar?) begin lymphedema exercises and perform MLD for B LEs.      Subjective:         Social information:   Living Situation:single family home   Occupation:retired   Work Status:NA   Equipment Available: None    History of Present Illness:    Patient had an achilles tendon repair in December 2019; the original injury was in November 2019 when walking and foot slipped off the log he walking on it.  Infection started in December.    Swelling start in May 2020.   Hx of prostate cancer in ; had surgery at that time, no other treatment needed.     Pain Ratin  Pain rating at best: 0  Pain rating at worst: 0  Pain description: edema      Patient reports benefit from:  OTC compression socks       Objective:      Patient Outcome Measures :    Lymphedema Life Impact Score (%): 19     Scores range from %, where a score of 20% represents the least impact on the individual's life (minimal physical, psychosocial and functional concerns).     Right Lower Extremity Total Estimated Volume (cm3): 4665.31  Left Lower Extremity Total Estimated Volume (cm3): 4496.67  Lower Extremity Swelling Comparison (%): 3.75 %     Lower Extremity Lymphedema  2020   R Big Toe (cm) 9.2   R 10cm from tip of second toe (cm) 22.5   R 20cm from tip of second toe (cm) 26.9   R 30cm from tip of second toe (cm) 25.2   R 40cm from tip of second toe (cm) 31.9   R 50cm from tip of second toe (cm) 33   R 60cm from tip of second toe (cm) 37.4   R 70cm from tip of second toe (cm) 39   Right Lower Extremity Total Estimated Volume (cm3) 4665.31   L Big Toe (cm) 9.2   L 10cm from tip of second toe (cm) 22.5   L 20cm from tip of second toe (cm) 26.2   L 30cm from tip of second toe (cm) 23.9   L 40cm from tip of second toe (cm) 31.1   L 50cm from tip of second toe (cm) 33.2   L 60cm from tip of second toe (cm) 36.3   L 70cm from tip of second toe (cm) 39.2   Left Lower Extremity Total Estimated Volume (cm3) 4496.67   Swelling Description Right>Left   Lower Extremity Swelling Comparison (%) 3.75       Examination  1. Acquired lymphedema of leg     2. Scar condition and fibrosis of skin     3. Prostate cancer (H)     4. Venous hypertension of lower extremity, bilateral       Involved side: Bilateral and R > L       Surgery: Prostate  achilles tendon repair  Treatments: none  Precautions/Restrictions: None  Involved area: Bilateral and R > L  Leg  Edema: Pitting at grade, 1+ and  Stemmers sign  positive  Induration: Yes  Fibrosis: mild  Temperature: Normal  Sensation: Normal  Skin color: Reddened and Brown  Skin texture: Dry and Shiny  Scars: Location: medial right ankle and medial fore foot from achilles tendon repair  Size: long thin inscision   Wounds: Absent - small abrasion on the left lateral ankle   Muscle tone: Atrophy and bilateral calf atrophy due to achilles injuries   Gait: WNL        Treatment Today   7/22/2020  TREATMENT MINUTES COMMENTS   Evaluation 30     Self-care/ Home management 12 Education on skin care, infection prevention and increased risk for infection  Education on care of bandages and theory behind therapy and compression wraps    Manual therapy 15 MDL to right LE only in clinic today with education on MLD   Medical compression bandages to right LE   - tetra- size F from foot to knee  comprilan short stretch 10cm x 5m x 2 rolls from foot to knee with x pattern around ankle   Neuromuscular Re-education     Therapeutic Activity     Therapeutic Exercises     Gait training     Modality__________________                Total 57    Blank areas are intentional and mean the treatment did not include these items.   PT Evaluation Code: (Please list factors)  Patient History/Comorbidities: as above   Examination: as above   Clinical Presentation: stable   Clinical Decision Making: low     Patient History/  Comorbidities Examination  (body structures and functions, activity limitations, and/or participation restrictions) Clinical Presentation Clinical Decision Making (Complexity)   No documented Comorbidities or personal factors 1-2 Elements Stable and/or uncomplicated Low   1-2 documented comorbidities or personal factor 3 Elements Evolving clinical presentation with changing characteristics Moderate   3-4 documented comorbidities or personal factors 4 or more Unstable and unpredictable High     Nayeli Goodwin, PT, DPT, CLT   7/22/2020  3:12 PM

## 2021-06-09 NOTE — TELEPHONE ENCOUNTER
Patient has been notified of the us results.  Patient stated understanding    ----- Message from Suzanne Zelaya MD sent at 7/9/2020 10:33 AM CDT -----  Please let the patient know his venous study was fine.  He should continue the plan of care as outlined at his visit.

## 2021-06-09 NOTE — TELEPHONE ENCOUNTER
----- Message from Suzanne Zelaya MD sent at 7/14/2020  5:18 PM CDT -----  Please let patient know his MRI was fine.  He should be continue the plan of care as outlined at his last visit.

## 2021-06-09 NOTE — TELEPHONE ENCOUNTER
Jean called back and I updated him that Dr. Zelaya reviewed the MRI result and it was fine and to continue with the plan of care.

## 2021-06-09 NOTE — TELEPHONE ENCOUNTER
Called patient left a message -  US Wednesday 7/8/20 at 3:30pm, MR on Thursday 7/9/20 at 7:45pm - both studies at the [place] location.  Unable to get both studies in the [place] area on the same day.

## 2021-06-10 NOTE — PROGRESS NOTES
[facility]  Rehabilitation Daily Progress     Patient Name: Satish Lang  Date: 7/29/2020  Visit #: 3/12  PTA visit #:    Referral Diagnosis: Leg swelling  Referring provider: Suzanne Zelaya, *  Visit Diagnosis:     ICD-10-CM    1. Acquired lymphedema of leg  I89.0    2. Scar condition and fibrosis of skin  L90.5    3. Prostate cancer (H)  C61    4. Venous hypertension of lower extremity, bilateral  I87.303        Diagnoses and all orders for this visit:    Acquired lymphedema of leg    Scar condition and fibrosis of skin    Prostate cancer (H)    Venous hypertension of lower extremity, bilateral       Satish Lang is a 70 y.o. male who presents to therapy today with chief complaints of right > left ankle and lower leg edema that started in May 2020. Patient had an achilles tendon tear in November and then under went a repair in December 2019 in which he developed an infection. The patient has hx of prostate cancer in 2014 in which he had surgery and was told the lymph nodes were clear and he did not have to have chemo or radiation treatments. Patient demonstrates a 3.75% increase in volume on the right side but does demonstrate some edema at the ankle on the left side. He also has a hx of an achilles tendon tear on the left side that was not repaired but was in a boot to heal. Patient will benefit from skilled therapy to decrease edema, decrease scar tissue and decrease risk for infections.      Impairments in  ROM, joint mobility, strength, gait/locomotion and balance, swelling  Assessment:      Pt demo's .5% reduction over past week with compression bandaging B LEs    HEP/POC compliance is  good .  Patient is benefitting from skilled physical therapy and is making steady progress toward functional goals.  Patient is appropriate to continue with skilled physical therapy intervention, as indicated by initial plan of care.     Therapy interventions being performed are medically necessary, are  being delivered according to accepted standards of medical practice, and require the skills of a therapist to perform the services.      Goal Status:    Patient will have a decreased volume in : bilateral;LE;by 5%;to decrease risk of infection;for ease of movement;in 6 weeks - IMPROVING    Patient will have decreased fibrosis, scar tissue for improved lymphatic mobilitiy : in 6 weeks - IMPROVING    Patient will perform, verbalize self-management of: skin care;self-monitoring;exercise;massage;compression wear;compression care;infection prevention;in 6 weeks - IMPROVING      Plan / Patient Education:     Continue with initial plan of care.   Assess response to tetragrip F on L left and remeasure B LE volumes.    Subjective:     Pain Ratin   Pt reports he has been re-wrapped his leg about 4 times since last week.  Pt reports he has been doing the lymph exercise program daily and he likes how it feels.  Pt feels like he ankle has looked pretty good in the mornings lately.    Patient Outcome Measures:  Lymphedema Life Impact Score (%): 19     Scores range from %, where a score of 20% represents the least impact on the individual's life (minimal physical, psychosocial and functional concerns). FROM INITIAL    Objective:     Caregiver present: No    Observation of swelling: R ankle is better.     Volume measurements taken:  Yes  Right Lower Extremity Total Estimated Volume (cm3): 4642.86  Left Lower Extremity Total Estimated Volume (cm3): 4477.79  Right LE change of volume from initial (%): -0.48  Left LE change of volume from initial (%): -0.42  Lower Extremity Swelling Comparison (%): 3.69 %    Lymphedema Assessment 2020   Right Lower Extremity Total Estimated Volume (cm3) 4665.31 4642.86   Right LE change of volume from last visit (%) - -0.48   Right LE change of volume from initial (%) - -0.48   Left Lower Extremity Total Estimated Volume (cm3) 4496.67 4477.79   Left LE change of volume from  last visit (%) - -0.42   Left LE change of volume from initial (%) - -0.42   Swelling Description Right>Left Right>Left   Lower Extremity Swelling Comparison (%) 3.75 3.69       Skin condition is:  better, scar tissue medial R ankle moderate with moderate adhesions     Compression:  Tolerance to compression is  good .  No compression. Last worn taken off just before appointment.    Medication for infection:  No    Treatment Today      TREATMENT MINUTES COMMENTS   Evaluation     Self-care/ Home management     Manual therapy 55 Reassessed B LE volumes and discussed progress.  Manual therapy: manual lymph drainage for right lower extremity lymphedema  Deep abdominal breath, neck effleurage, short neck, shoulder collectors, bilateral axilla, bilateral inguinal, anterior inguinal to axilla anastomosis on right side, right lower extremity evacuation, abdomen, neck effleurage.    Medical compression bandages to right LE   - tetra- size F from foot to knee  comprilan short stretch 10cm x 5m foot to above ankle with X pattern at ankle and then 2nd 10 x 5 from ankle to knee - pt given written instructions for self-wrapping ideally 1x/day  - tetra- size F from foot to knee on L LE   Neuromuscular Re-education     Therapeutic Activity     Therapeutic Exercises     Gait training     Modality__________________                Total 55    Blank areas are intentional and mean the treatment did not include these items.       Sunitha Erazo PT, DPT, OCS, CLT  7/29/2020

## 2021-06-10 NOTE — PROGRESS NOTES
[facility]  Rehabilitation Daily Progress     Patient Name: Satish Lang  Date: 8/5/2020  Visit #: 4/12  PTA visit #:    Referral Diagnosis: Leg swelling  Referring provider: Suzanne Zelaya, *  Visit Diagnosis:     ICD-10-CM    1. Acquired lymphedema of leg  I89.0    2. Scar condition and fibrosis of skin  L90.5    3. Prostate cancer (H)  C61    4. Venous hypertension of lower extremity, bilateral  I87.303        Diagnoses and all orders for this visit:    Acquired lymphedema of leg    Scar condition and fibrosis of skin    Prostate cancer (H)    Venous hypertension of lower extremity, bilateral       Satish Lang is a 70 y.o. male who presents to therapy today with chief complaints of right > left ankle and lower leg edema that started in May 2020. Patient had an achilles tendon tear in November and then under went a repair in December 2019 in which he developed an infection. The patient has hx of prostate cancer in 2014 in which he had surgery and was told the lymph nodes were clear and he did not have to have chemo or radiation treatments. Patient demonstrates a 3.75% increase in volume on the right side but does demonstrate some edema at the ankle on the left side. He also has a hx of an achilles tendon tear on the left side that was not repaired but was in a boot to heal. Patient will benefit from skilled therapy to decrease edema, decrease scar tissue and decrease risk for infections.      Impairments in  ROM, joint mobility, strength, gait/locomotion and balance, swelling  Assessment:      Pt demo's ~3% reduction in B LE volumes over the past 2 weeks and R leg is only 4% larger than L - mostly around R ankle and knee jt lines.    HEP/POC compliance is  good .  Patient is benefitting from skilled physical therapy and is making steady progress toward functional goals.  Patient is appropriate to continue with skilled physical therapy intervention, as indicated by initial plan of care.      Therapy interventions being performed are medically necessary, are being delivered according to accepted standards of medical practice, and require the skills of a therapist to perform the services.      Goal Status:    Patient will have a decreased volume in : bilateral;LE;by 5%;to decrease risk of infection;for ease of movement;in 6 weeks - IMPROVING    Patient will have decreased fibrosis, scar tissue for improved lymphatic mobilitiy : in 6 weeks - IMPROVING    Patient will perform, verbalize self-management of: skin care;self-monitoring;exercise;massage;compression wear;compression care;infection prevention;in 6 weeks - IMPROVING      Plan / Patient Education:     Continue with initial plan of care.   Pt to go today for compression stocking appointment.   Pt to return next week for re-assessment B LE volumes and if doing well with lymph management with daily stocking wear and exercises D/C to I with home program.    Subjective:     Pain Ratin   Pt feels like his legs are doing well. He hopes to golf 18 holes this afternoon and walk the course.    Pt had mild pain in R achilles after wearing hiking boots but the next day pain was resolved.    Patient Outcome Measures:  Lymphedema Life Impact Score (%): 19     Scores range from %, where a score of 20% represents the least impact on the individual's life (minimal physical, psychosocial and functional concerns). FROM INITIAL    Objective:     Caregiver present: No    Observation of swelling: R ankle is better.     Volume measurements taken:  Yes  Right Lower Extremity Total Estimated Volume (cm3): 4542.19  Left Lower Extremity Total Estimated Volume (cm3): 4344.24  Right LE change of volume from initial (%): -2.17  Left LE change of volume from initial (%): -2.98  Lower Extremity Swelling Comparison (%): 4.56 %    Lymphedema Assessment 2020   Right Lower Extremity Total Estimated Volume (cm3) 4665.31 4642.86 4542.19   Right LE  change of volume from last visit (%) - -0.48 -2.17   Right LE change of volume from initial (%) - -0.48 -2.17   Left Lower Extremity Total Estimated Volume (cm3) 4496.67 4477.79 4344.24   Left LE change of volume from last visit (%) - -0.42 -2.98   Left LE change of volume from initial (%) - -0.42 -2.98   Swelling Description Right>Left Right>Left Right>Left   Lower Extremity Swelling Comparison (%) 3.75 3.69 4.56       Skin condition is:  better, scar tissue medial R ankle moderate with moderate adhesions     Compression:  Tolerance to compression is  good .  No compression. Last worn taken off just before appointment.    Medication for infection:  No    Treatment Today      TREATMENT MINUTES COMMENTS   Evaluation     Self-care/ Home management 8 Discussed wear and care with compression stocking that pt is supposed to get today.   Manual therapy 45 Reassessed B LE volumes and discussed progress.  Manual therapy: manual lymph drainage for right lower extremity lymphedema  Deep abdominal breath, neck effleurage, short neck, shoulder collectors, bilateral axilla, bilateral inguinal, anterior inguinal to axilla anastomosis on right side, right lower extremity evacuation, abdomen, neck effleurage.    Medical compression bandages to right LE   - tetra- size F from foot to knee  comprilan short stretch 10cm x 5m foot to above ankle with X pattern at ankle and then 2nd 10 x 5 from ankle to knee - pt given written instructions for self-wrapping ideally 1x/day  - tetra- size F from foot to knee on L LE   Neuromuscular Re-education     Therapeutic Activity     Therapeutic Exercises     Gait training     Modality__________________                Total 53    Blank areas are intentional and mean the treatment did not include these items.       Sunitha Erazo PT, DPT, OCS, CLT  8/5/2020

## 2021-06-10 NOTE — PROGRESS NOTES
S: Patient was seen in [place] to be measured for knee high compression stockings 20-30 mmHg or 30-40 mmHg as ordered by Dr. Zelaya.    O: Goal is to evaluate and measure patient for a compression garment that will help control his lymphedema. Observations show there is swelling present from lymphatic fluid. The expected outcome with compliant use is to reduce swelling and control the patient s condition.     A: I assisted Jean in taking off his wraps- he has been wrapping at [facility] in [place]. I  took measurements for OTS garments and gave him options. He really wants to transition into knee high stockings today and prefers a beige color. I went to [facility] and found stockings in his size that should work for him to wear. He would rather do this then wait for me to order in garments that can take up to two weeks. Because he has to pay out of pocket with the 20% discount- this will be a lower cost option for him too so he is happy with this. I told him if this doesn't help to keep his swelling down that we can also try custom flat knit or Velcro garments.     P: I walked him down to [facility] where he met with Fidelina to go over options and take stockings home to wear.

## 2021-06-10 NOTE — PROGRESS NOTES
[facility]  Rehabilitation Daily Progress/Discharge Summary     Patient Name: Satish Lang  Date: 8/11/2020  Visit #: 5/12  PTA visit #:    Referral Diagnosis: Leg swelling  Referring provider: Suzanne Zelaya, *  Visit Diagnosis:     ICD-10-CM    1. Acquired lymphedema of leg  I89.0    2. Scar condition and fibrosis of skin  L90.5    3. Prostate cancer (H)  C61    4. Venous hypertension of lower extremity, bilateral  I87.303        Diagnoses and all orders for this visit:    Acquired lymphedema of leg    Scar condition and fibrosis of skin    Prostate cancer (H)    Venous hypertension of lower extremity, bilateral       Satish Lang is a 70 y.o. male who presents to therapy today with chief complaints of right > left ankle and lower leg edema that started in May 2020. Patient had an achilles tendon tear in November and then under went a repair in December 2019 in which he developed an infection. The patient has hx of prostate cancer in 2014 in which he had surgery and was told the lymph nodes were clear and he did not have to have chemo or radiation treatments. Patient demonstrates a 3.75% increase in volume on the right side but does demonstrate some edema at the ankle on the left side. He also has a hx of an achilles tendon tear on the left side that was not repaired but was in a boot to heal. Patient will benefit from skilled therapy to decrease edema, decrease scar tissue and decrease risk for infections.      Impairments in  ROM, joint mobility, strength, gait/locomotion and balance, swelling  Assessment:      Pt demo's ~3-4% reduction in B LE volumes over the past 2 weeks and R leg is only 4% larger than L - mostly around R ankle and knee jt lines. He was able to maintain well with use of compression socks only in the last week.   He has met or nearly met all goals at this time. He will trial at home x 30 days. If he does not return then the patient will be discharged from therapy.      HEP/POC compliance is  good .  Patient is benefitting from skilled physical therapy and is making steady progress toward functional goals.  Patient is appropriate to continue with skilled physical therapy intervention, as indicated by initial plan of care.     Therapy interventions being performed are medically necessary, are being delivered according to accepted standards of medical practice, and require the skills of a therapist to perform the services.      Goal Status:    Patient will have a decreased volume in : bilateral;LE;by 5%;to decrease risk of infection;for ease of movement;in 6 weeks - Nearly MET, improving     Patient will have decreased fibrosis, scar tissue for improved lymphatic mobilitiy : in 6 weeks - MET    Patient will perform, verbalize self-management of: skin care;self-monitoring;exercise;massage;compression wear;compression care;infection prevention;in 6 weeks - MET      Plan / Patient Education:     Continue with initial plan of care.     Hold chart x 30 days, possible DC if the patient does not return to therapy.   Patient to continue with socks during the day, can wrap at night if needed continue with HEP and walking/exercises     Subjective:     Pain Ratin   Patient reports he is doing well, he has new compression socks on and they fit well. Pain is doing ok as well. Golfing went well, able to walk 18 holes ok.   Feels comfortable with self care.     Patient Outcome Measures:  Lymphedema Life Impact Score (%): 19     Scores range from %, where a score of 20% represents the least impact on the individual's life (minimal physical, psychosocial and functional concerns). FROM INITIAL    Objective:     Caregiver present: No    Observation of swelling: R ankle is better.     Volume measurements taken:  Yes  Right Lower Extremity Total Estimated Volume (cm3): 4485.05  Left Lower Extremity Total Estimated Volume (cm3): 4308.61  Right LE change of volume from initial (%):  -3.86  Left LE change of volume from initial (%): -4.18  Lower Extremity Swelling Comparison (%): 4.1 %    Lymphedema Assessment 7/22/2020 7/29/2020 8/5/2020 8/11/2020   Right Lower Extremity Total Estimated Volume (cm3) 4665.31 4642.86 4542.19 4485.05   Right LE change of volume from last visit (%) - -0.48 -2.17 -1.26   Right LE change of volume from initial (%) - -0.48 -2.17 -3.86   Left Lower Extremity Total Estimated Volume (cm3) 4496.67 4477.79 4344.24 4308.61   Left LE change of volume from last visit (%) - -0.42 -2.98 -0.82   Left LE change of volume from initial (%) - -0.42 -2.98 -4.18   Swelling Description Right>Left Right>Left Right>Left Right>Left   Lower Extremity Swelling Comparison (%) 3.75 3.69 4.56 4.1       Skin condition is:  better, scar tissue medial R ankle moderate with moderate adhesions     Compression:  Tolerance to compression is  good .  No compression. Last worn taken off just before appointment.    Medication for infection:  No    Treatment Today      TREATMENT MINUTES COMMENTS   Evaluation     Self-care/ Home management 10 Discussed wear and care with compression stocking, donning/doffing and when to wear and when to have off, also instructed patient could wrap leg at night if needed.      Manual therapy 45 Reassessed B LE volumes and discussed progress.  Manual therapy: manual lymph drainage for right lower extremity lymphedema  Deep abdominal breath, neck effleurage, short neck, shoulder collectors, bilateral axilla, bilateral inguinal, anterior inguinal to axilla anastomosis on right side, right lower extremity evacuation, abdomen, neck effleurage.   Neuromuscular Re-education     Therapeutic Activity     Therapeutic Exercises     Gait training     Modality__________________                Total 55    Blank areas are intentional and mean the treatment did not include these items.       Nayeli Goodwin PT, DPT, CLT  8/11/2020

## 2021-06-11 NOTE — PATIENT INSTRUCTIONS - HE
"Continue exercise, compression and bandaging as needed.       Over the counter insoles:  Superfeet/Green or Blue Profile Insole:    May obtained from [facility] in [place], [employer], or local sporting [email or web address] stores like [employer], etc.  Please call ahead to be sure they are available.              To order over the counter compression socks (such [email or web address] or other online site):     Compression needed:  20-30 mmHg and 30-40 mmHg    Length:  knee high    Toe Piece:  open toe or closed toe    Measures:        Inches (in)  Centimeters (cm)    Ankle R - 10.1  L - 9.3 R - 25.8  L - 23.7   Calf (Widest Part) R - 13.6  L - 13.4 R - 34.5  L - 34                 You may need to search \"compression sock large or extra large\"        Swelling in the legs can be caused by many reasons. No matter what the reason, treatment usually includes some type of compression. You should wear your compression socks as much as you can. Your compression should be put on first thing in the morning. Take the compression off at night. It is especially important to wear them with long periods of sitting/standing, long car rides or if you will be flying. Going without compression for even brief periods of time can be damaging to your legs and your health.  Compression socks should get replaced usually every 4-6 months. They do not need to be worn at night while in bed. If we have seen you in the last year, we can refill your sock prescription, otherwise your primary care provider is able to refill them for you. Call us with any problems or questions.   Compression Velcro may need to be replaced every 9-12 months.  Often the liners and socks need to be replaced every three or four months.  The neoprene velcro may last up to 2 years.  If the velcro becomes worn a tailor may be able to repair it for you inexpensively.    If you do a lot of standing, it is good to do calf raises to help keep the blood pumping. If " "you sit a lot at work, it is good to get up periodically to walk around. Elevation of the foot of your bed 4-6\" helps the blood return back to where it is needed.   Please call us if you have any questions [phone]    Thank you for choosing [facility].    "

## 2021-06-13 NOTE — PROGRESS NOTES
===View-only below this line===  ----- Message -----  From: Nayana Gomes MD  Sent: 12/17/2020  10:34 AM CST  To: Gelacio Blanco RN  Subject: RE: CT and ECHO order                            Yes to both studies, please. They can be done after his clinic visit if necessary.    EG  ----- Message -----  From: Gelacio Blanco RN  Sent: 12/16/2020   1:56 PM CST  To: Nayana Gomes MD  Subject: FW: CT and ECHO order                            Dr Gomes,  Appropriate plan to order CTA chest and Complete Echo prior to appt with you in clinic?   OV with you on 1/26/20, or would you need in office visit first?  Please advise.  Thank you RIC BOO   ----- Message -----  From: Julieta Mcgraw  Sent: 12/14/2020   2:49 PM CST  To: Gelacio Blanco RN  Subject: CT and ECHO order                                Patient needs a CT and ECHO prior to appointment in clinic. Please place orders.

## 2021-06-13 NOTE — PROGRESS NOTES
Thank you for asking the [facility] team to see Satish Lang      Assessment/Plan:     1. CAD - LDL at goal, 74 last year. Planning repeat labs with PCP in 1 month. He is asymptomatic and active. Stress test normal last year. Continue statin and aspirin.       2. Ectatic thoracic aorta - Mild growth vs. Normal variation seen on CTA in September. Plan repeat CTA in 1 year. BP controlled.      3. Mild AI, likely related to aortic ectasia. Mild murmur noted.       F/u 1 year         Current History:   Satish Lang is a 67 y.o. with an extensive family history of CAD without warning symptoms and a personal history of thoracic aortic ectasia here for f/u. In 2014 he had a coronary CTA with a calcium score in the 600s, which put him in the 75-90% risk group for 10 years. The calcium was primarily in the mid LAD. He also had an echo consistent with moderate RV dysfunction in the setting of chest pain in 2014 but the repeat echo 2015 was read as normal with mild AI. Stress echo 11/2016 was 11:00 with EKG strain but no wall motion abnormalities.      He returns for annual f/u and is exercising regularly and is loving mountain biking and kayaking. He denies CP, dyspnea, palpitations, LH. He feels well and is tolerating his medications. 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  CTA from September as below.       CTA chest 9/2017:  ANGIOGRAM CHEST: Stable to marginally increased size of the enlarged ascending aorta measuring 4.3 cm, previously 4.2 cm. No dissection. No pulmonary artery emboli.   LUNGS AND PLEURA: Borderline dilated airways. The lungs are clear.   MEDIASTINUM: Severe coronary calcifications. No adenopathy.   LIMITED UPPER ABDOMEN: Cholelithiasis. Stable 2 mm nonobstructing left renal calculus. Probable hepatic cysts. Redemonstrated acute angle takeoff of the celiac axis (sagittal series 400, image 101), which can be seen with celiac compression  "syndrome.   MUSCULOSKELETAL: Stable T12 hemangioma. Stable right second rib mixed lucency and sclerosis.    IMPRESSION:   CONCLUSION:   1.  Stable to marginally increased enlargement of the ascending aorta (4.3 cm versus 4.2 cm previously). No dissection.   2.  Other findings as detailed.    Past Medical History:     Past Medical History:   Diagnosis Date     Hypercholesterolemia      Hypertension      Prostate cancer        Past Surgical History:     Past Surgical History:   Procedure Laterality Date     PROSTATECTOMY         Family History:     Family History   Problem Relation Age of Onset     Hyperlipidemia Mother      Heart disease Mother      Cancer Mother      Heart disease Father      Coronary artery disease Brother      PTCA with stenting     Coronary artery disease Brother      PTCA with stenting     Coronary artery disease Sister      PTCA with stenting       Social History:    reports that he has never smoked. He does not have any smokeless tobacco history on file. He reports that he drinks alcohol. He reports that he does not use illicit drugs.    Meds:     Current Outpatient Prescriptions   Medication Sig     amLODIPine-valsartan-hcthiazid 5-160-12.5 mg Tab Take 1 tablet by mouth daily.     aspirin 81 MG EC tablet Take 81 mg by mouth daily.     atorvastatin (LIPITOR) 40 MG tablet 40 mg bedtime.        Allergies:   Review of patient's allergies indicates no known allergies.    Review of Systems:   Review of Systems:   General: WNL  Eyes: WNL  Ears/Nose/Throat: WNL  Lungs: WNL  Heart: WNL  Stomach: WNL  Bladder: WNL  Muscle/Joints: WNL  Skin: WNL  Nervous System: WNL  Mental Health: WNL     Blood: WNL       Objective:      Physical Exam  @LASTENCWT:3@  5' 10\" (1.778 m)  @BMI:3@  /72 (Patient Site: Right Arm, Patient Position: Sitting, Cuff Size: Adult Large)  Pulse (!) 52  Resp 16  Ht 5' 10\" (1.778 m)  Wt 192 lb (87.1 kg)  BMI 27.55 kg/m2    General Appearance:   Alert, cooperative and in no " acute distress.   HEENT:  No scleral icterus; the mucous membranes were pink and moist.   Neck: JVP flat. No thyromegaly. No HJR   Chest: The spine was straight. The chest was symmetric.   Lungs:   Respirations unlabored; the lungs are clear to auscultation.   Cardiovascular:   S1 and S2 normal and with 1/6 diastolic murmur. No clicks or rubs. No carotid bruits noted. Right DP, PT, and radial pulses 2+. Left DP, PT, and radial pulses 2+.   Abdomen:  No organomegaly, masses, bruits, or tenderness. Bowels sounds are present   Extremities: No cyanosis, clubbing, or edema.   Skin: No xanthelasma.   Neurologic: Mood and affect are appropriate.         Lab Review   Lab Results   Component Value Date     10/11/2016     10/21/2015     03/25/2015    K 3.8 10/11/2016    K 3.6 10/21/2015    K 3.7 03/25/2015     10/11/2016     10/21/2015     03/25/2015    CO2 29 10/11/2016    CO2 29 10/21/2015    CO2 25 03/25/2015    BUN 14 10/11/2016    BUN 14 10/21/2015    BUN 15 03/25/2015    CREATININE 0.92 10/11/2016    CREATININE 1.05 10/21/2015    CREATININE 0.83 03/25/2015    CALCIUM 9.4 10/11/2016    CALCIUM 9.4 10/21/2015    CALCIUM 9.2 03/25/2015     Lab Results   Component Value Date    WBC 9.1 09/10/2014    HGB 15.0 09/10/2014    HGB 13.6 (L) 03/08/2012    HCT 45.0 09/10/2014    MCV 93 09/10/2014     09/10/2014     03/07/2012     Lab Results   Component Value Date    CHOL 145 10/11/2016    CHOL 138 03/25/2015    TRIG 55 10/11/2016    TRIG 105 03/25/2015    HDL 60 10/11/2016    HDL 51 03/25/2015     No results found for: BNP      Nayana Gomes M.D.

## 2021-06-14 NOTE — PATIENT INSTRUCTIONS - HE
- Keep up the great work with exercise    - Eat a healthy, mediterranean diet    - See me in 2 year. Will get a CT scan in 2 years as well.

## 2021-06-16 PROBLEM — L90.5 SCAR CONDITION AND FIBROSIS OF SKIN: Status: ACTIVE | Noted: 2020-06-30

## 2021-06-16 PROBLEM — I87.303 VENOUS HYPERTENSION OF LOWER EXTREMITY, BILATERAL: Status: ACTIVE | Noted: 2020-06-30

## 2021-06-16 PROBLEM — I35.1 MILD AORTIC INSUFFICIENCY: Status: ACTIVE | Noted: 2017-10-26

## 2021-06-16 PROBLEM — D12.6 BENIGN NEOPLASM OF COLON: Status: ACTIVE | Noted: 2020-10-01

## 2021-06-16 PROBLEM — H93.13 BILATERAL TINNITUS: Status: ACTIVE | Noted: 2020-10-01

## 2021-06-16 PROBLEM — H26.9 CATARACT: Status: ACTIVE | Noted: 2020-10-01

## 2021-06-16 PROBLEM — I89.0 ACQUIRED LYMPHEDEMA OF LEG: Status: ACTIVE | Noted: 2020-06-30

## 2021-06-16 PROBLEM — B00.1 COLD SORE: Status: ACTIVE | Noted: 2020-10-01

## 2021-06-16 PROBLEM — Q66.70 HIGH ARCHES, CONGENITAL: Status: ACTIVE | Noted: 2020-06-30

## 2021-06-16 PROBLEM — J31.0 CHRONIC RHINITIS: Status: ACTIVE | Noted: 2020-10-01

## 2021-06-16 PROBLEM — M79.89 LEG SWELLING: Status: ACTIVE | Noted: 2020-06-30

## 2021-06-16 PROBLEM — R41.3 AMNESIA: Status: ACTIVE | Noted: 2020-10-01

## 2021-06-16 PROBLEM — Z85.46 HISTORY OF MALIGNANT NEOPLASM OF PROSTATE: Status: ACTIVE | Noted: 2020-10-01

## 2021-06-16 PROBLEM — R59.0 INGUINAL LYMPHADENOPATHY: Status: ACTIVE | Noted: 2020-06-30

## 2021-06-16 PROBLEM — I25.10 CORONARY ARTERY CALCIFICATION SEEN ON CAT SCAN: Status: ACTIVE | Noted: 2017-10-26

## 2021-06-16 PROBLEM — C61 PROSTATE CANCER (H): Status: ACTIVE | Noted: 2020-06-30

## 2021-06-16 NOTE — PATIENT INSTRUCTIONS - HE
"Swelling in the legs can be caused by many reasons. No matter what the reason, treatment usually includes some type of compression. You should wear your compression socks as much as you can. Your compression should be put on first thing in the morning. Take the compression off at night. It is especially important to wear them with long periods of sitting/standing, long car rides or if you will be flying. Going without compression for even brief periods of time can be damaging to your legs and your health.  Compression socks should get replaced usually every 4-6 months. They do not need to be worn at night while in bed. If we have seen you in the last year, we can refill your sock prescription, otherwise your primary care provider is able to refill them for you. Call us with any problems or questions.   Compression Velcro may need to be replaced every 9-12 months.  Often the liners and socks need to be replaced every three or four months.  The neoprene velcro may last up to 2 years.  If the velcro becomes worn a tailor may be able to repair it for you inexpensively.    If you do a lot of standing, it is good to do calf raises to help keep the blood pumping. If you sit a lot at work, it is good to get up periodically to walk around. Elevation of the foot of your bed 4-6\" helps the blood return back to where it is needed.   Please call us if you have any questions [phone]    Thank you for choosing [facility].  "

## 2021-06-17 NOTE — TELEPHONE ENCOUNTER
Telephone Encounter by Lily Huff at 7/6/2020 12:05 PM     Author: Lily Huff Service: -- Author Type: --    Filed: 7/6/2020 12:13 PM Encounter Date: 7/6/2020 Status: Signed    : Lily Huff

## 2021-06-18 NOTE — PATIENT INSTRUCTIONS - HE
Patient Instructions by Sunitha Erazo PT at 7/23/2020  3:00 PM     Author: Sunitha Erazo PT Service: -- Author Type: Physical Therapist    Filed: 7/23/2020  4:06 PM Encounter Date: 7/23/2020 Status: Addendum    : Sunitha Erazo PT (Physical Therapist)    Related Notes: Original Note by Sunitha Erazo PT (Physical Therapist) filed at 7/23/2020  3:58 PM       LYMPHEDEMA LEG EXERCISE PROGRAM  Perform all exercises in order 5-10 reps 1-2x/day    NECK   bend forward   twist side to side       Tip side to side   shoulder shrug  squeeze shoulder blades back    BACK   bend forward and backward     Twist side to side  bend side to side with arm overhead   ARMS  arms raising forward  raise arms sideways    LEGS         Marching    Holding onto a heavy chair or counter, alternately lift knees, taking high steps.    Perform       Reps     Times per day        Side Leg Kicks    Kick leg out to the side and slowly bring back to center.    Perform       Reps     Times per day      Back Leg Kicks    Kick leg back as far as possible standing tall with your back upright.    Perform       Reps     Times per day              Hamstring Curl    Laying on your stomach or standing upright slowly bend your knee as you bring your foot towards your buttock.            Toe/Heel Raises    Gently rise up on toes and roll back on heels.    Perform       Reps     Times per day       Apply the bandaging in the following sequence.    1. Lotion    2. Pull on the tubing     5. Brown bandage: 1st - two spirals at base of toes and then spiral to ankle and then don cross over ankle x2 and then spiral up ankle to shin    6. Brown bandage: 2nd - spiral from front of ankle to knee

## 2021-06-18 NOTE — PATIENT INSTRUCTIONS - HE
"Patient Instructions by Suzanne Zelaya MD at 6/30/2020  9:00 AM     Author: Suzanne Zelaya MD Service: -- Author Type: Physician    Filed: 6/30/2020 10:36 AM Encounter Date: 6/30/2020 Status: Addendum    : Eva Kang RN (Registered Nurse)    Related Notes: Original Note by Suzanne Zelaya MD (Physician) filed at 6/30/2020 10:11 AM       To order compression socks through [email or web address]:     Compression needed:  20-30 mmHg    Length:  knee high    Toe Piece:  open toe or closed toe    Measures: Please have patient wait to get rightankle swelling down before measuring        Inches (in)  Centimeters (cm)    Ankle     Calf (Widest Part)     Thigh     Length-heel to knee       Short   Regular     Long       You may need to search \"compression sock large or extra large\"    Superfeet/Green or Blue Profile Insole:    May obtained from 3rd floor Specialty center in [facility], [employer], or local sporting Queryly stores like [employer], etc.  Please call ahead to be sure they are available.      Ultrasound    Thank you for choosing [facility] as partners in your care.  Please read the following information before your appointment.  Feel free to ask questions.    An ultrasound is an exam that uses sound waves to create pictures.  The special camera that takes the pictures is called a transducer.  An ultrasound technologist will perform the exam under the direction of a physician.    Preparation  Ultrasound preps vary.  If you are scheduled for an Aorta Ultrasound, please do not eat or drink anything 8 hours before your exam.  You may take daily medications with a small sip of water.  There is no preparation required for any of the other ultrasound exams performed at [facility].    The Examination  You may or may not need to change clothes for your exam.  The technologist will explain the exam to you.  He or she will ask you a few questions and answer any " questions you may have.    You will lie on a table and a gel will be applied to your skin.  A small handheld instrument called a transducer will be moved across the area we are looking at while pictures are taken.  Also, your exam may include measuring your blood pressure on your arms, legs and/or toes.    When the Exam Is Completed  Your physician will receive the results of the exam and explain them to you.  You may return to your normal diet and activities unless otherwise directed by your doctor.  Feel free to ask questions if something is not clear to you.  We welcome comments.    At [facility], we are dedicated to providing the best possible care.  Thank you for taking time to read these instructions.  We hope your experience is as pleasant as possible.      You are scheduled for the following exam(s):    []Carotid Duplex:  Evaluates the carotid arteries in the neck that feed the brain with blood.  Gel is applied to the skin of the neck.  A transducer will be placed on the gel covered areas to obtain images and evaluate and listen to the blood flow in the arteries.  This exam takes approximately 30 minutes.    [x]Venous Duplex:  Evaluates the veins that carry blood to the heart from the arms and/or legs.  Gel is applied to the skin of the arms and/or legs.  A transducer will be placed on the gel covered areas to obtain images and evaluate flow in the veins.  This exam takes approximately 30 minutes per arm/leg.    []Arterial Duplex:  Evaluates the arteries that feed the arms and legs with blood.  Gel is applied to the skin of the arms and/or legs.  A transducer will be placed on the gel covered areas to obtain images and listen to the blood flow in the arms and/or legs.  This exam takes approximately 30 minutes per arm/leg.    []JOANA or Segmental Pressures:  Ultrasound and blood pressure cuffs are used to evaluate the arteries that supply the arms and legs with blood.  Several blood pressure cuffs will be place  on your arms and legs.  When inflated, the cuffs will provide blood pressure readings that are used to determine where blockages in the arteries may be.  You may be asked to do a moderate level of exercise during this exam.  This exam takes approximately 30-60 minutes.    []Aorta:  Evaluates the abdominal aorta for blockages and aneurysm.  Gel is applied to the stomach.  A transducer will be placed on the gel covered areas to obtain images of the aorta.  This exam takes approximately 30 minutes.    Prep instructions:  Do not eat or drink anything 8 hours before procedure.  You may take daily medications with a small sip of water.    Magnetic Resonance Imaging (MRI)     You will be asked to hold very still during the scan.     Magnetic resonance imaging (MRI) is a test that lets your doctor see detailed pictures of the inside of your body. MRI combines the use of strong magnets and radio waves to form an MRI image.  How do I get ready for an MRI?    Follow any directions you are given for not eating or drinking before the test.    Ask your provider if you should stop taking any medicine before the test.    Follow your normal daily routine unless your provider tells you otherwise.    You'll be asked to remove your watch, jewelry, hearing aids, credit cards, pens, pocket knives, eyeglasses, and other metal objects.    You may be asked to remove your makeup. Makeup may contain some metal.    Most MRI tests take 30 to 60 minutes. Depending on the type of MRI you are having, the test may take longer. Give yourself extra time to check in.      MRI uses strong magnets. Metal is affected by magnets and can distort the image. The magnet used in MRI can cause metal objects in your body to move. If you have a metal implant, you may not be able to have an MRI unless the implant is certified as MRI safe. People with these implants should not have an MRI:    Ear (cochlear) implants    Certain clips used for brain  aneurysms    Certain metal coils put in blood vessels    Most defibrillators    Most pacemakers  Be sure to tell the radiologist or technologist if you:    Have had any previous surgeries    Have a pacemaker, surgical clips, metal plate or pins, an artificial joint, staples or screws, ear (cochlear) implants, or other implants    Wear a medicated adhesive patch    Have metal splinters in your body    Have implanted nerve stimulators or drug-infusion ports    Have tattoos or body piercings. Some tattoo inks contain metal.    Work with metal    Have braces. You must remove any dental work.    Have a bullet or other metal in your body  Also tell the radiologist or technologist if you:    Are pregnant or think you may be    Are afraid of small, enclosed spaces (claustrophobic)    Are allergic to X-ray dye (contrast medium), iodine, shellfish, or any medicines    Have other allergies    Are breastfeeding    Have a history of cancer    Have any serious health problems. This includes kidney disease or a liver transplant. You may not be able to have the contrast material used for MRI.   What happens during an MRI?    You may be asked to wear a hospital gown.    You may be given earplugs to wear if you need them.    You may be injected with a special dye (contrast) that improves the MRI image.     Youll lie down on a platform that slides into the magnet.  What happens after an MRI?    You can get back to normal activities right away. If you were given contrast, it will pass naturally through your body within a day. You may be told to drink more water or other fluids during this time.     Your doctor will discuss the test results with you during a follow-up appointment or over the phone.    Your next appointment is: _____the will call you_____________  Date Last Reviewed: 6/1/2017 2000-2017 [employer]. [address] [place] [address], [place] [address]. All rights reserved. This information is not intended as a  substitute for professional medical care. Always follow your healthcare professional's instructions.        A referral was sent to [facility]. You will receive a phone call in 1-2 business days to schedule you appointment. If for some reason you do not hear from them or wish to schedule sooner please call [phone] to schedule.  [facility]  [address], [address]  [place] [address]    LYMPHEDEMA THERAPY TREATMENT     - What to expect your first visit     Based on your initial evaluation, you will have an individualized program designed by a certified lymphedema therapist.     It will consist of discussing your prior activity level, goals and limitations.     The therapist will assess your edema, evaluate for swelling, ,measure your motion and strength.      - Treatment usually includes     Lymphedema education and prevention strategies.     Lymphedema massage- A special decompressive massage to help improve the lymphatic drainage.     Lymphatic stimulation exercises     Bandaging or compression garments- To provide increased tissue pressure in the swollen extremity.     Home exercise program instruction     Stretching exercises     Education in how to independently manage edema.        - Follow up care     ONCE FORMAL THERAPY HAS BEEN COMPLETED, THE PATIENT WILL BE EXPECTED TO WEAR THE APPROPRIATE COMPRESSION BANDAGES, BE CONSISTENT WITH THE SKIN CARE PROGRAM  AND PERFORM THE PRESCRIBED SELF -MASSAGE AND /OR EXERCISES AS PRESCRIBED.    [facility] (Please call to make an appointment)    [facility]  [address], [address]  [place], [place].  Phone: [phone]    Other Locations: (Depending on location may be limited in what products are available)    [facility]  [address] [place] [address]  Phone [phone]    [facility]  [address]  [place] [address]  Phone: [phone]    [facility]   [address]  [place] [address]  Phone: [phone]    [facility]  [address]  [place] [address]  Phone: [phone]    [facility]  [address]  [address]  [place] [address]  Phone: [phone]    [facility] [place]  [address]   [place] [address]   Phone: [phone]    [address]  [place] [address] [phone]    [facility]  [address] [place] [address]   Phone: [phone]

## 2021-06-22 NOTE — PROGRESS NOTES
Thank you for asking the [facility] Heart Care team to see aStish Lang     Assessment/Plan:     Aortic ectasia/aneurysm - CTA in 1 year. BP and cholesterol control. Limit lifting to 40 lbs. Symptoms of enlargement/dissection discussed and when to go the ER reviewed.    HTN - controlled. Diet and exercise reviewed.    Lipid - check today    Coronary calcification - lipid control. Aspirin 81mg daily.     F/U 1 year with CT angiogram.        Current History:   Satish Lang is a 68 y.o. with an extensive family history of CAD without warning symptoms and a personal history of thoracic aortic ectasia. In 2014 he had a coronary CTA with a calcium score in the 600s, which put him in the 75-90% risk group for 10 years. The calcium was primarily in the mid LAD. He also had an echo consistent with moderate RV dysfunction in the setting of chest pain in 2014 but the repeat echo 2015 was read as normal with mild AI. Stress echo 11/2016 was 11:00 with EKG strain but no wall motion abnormalities. CTA chest in serial has shown a slow growing ascending aortic ectasia/aneurysm that was 4.5 cm this fall.      He returns for annual f/u today. He is feeling well. He rode his bike a lot this summer and is going to the gym a couple of times per week without trouble. He denies chest pain or dyspnea. No medication issues.    Past Medical History:     Past Medical History:   Diagnosis Date     Hypercholesterolemia      Hypertension      Prostate cancer (H)        Past Surgical History:     Past Surgical History:   Procedure Laterality Date     PROSTATECTOMY         Family History:     Family History   Problem Relation Age of Onset     Hyperlipidemia Mother      Heart disease Mother      Cancer Mother      Heart disease Father      Coronary artery disease Brother         PTCA with stenting     Coronary artery disease Brother         PTCA with stenting     Coronary artery disease Sister         PTCA with stenting       Social History:  "   reports that  has never smoked. He does not have any smokeless tobacco history on file. He reports that he drinks alcohol. He reports that he does not use drugs.    Meds:     Current Outpatient Medications   Medication Sig     amLODIPine-valsartan-hcthiazid 5-160-12.5 mg Tab Take 1 tablet by mouth daily.     aspirin 81 MG EC tablet Take 81 mg by mouth daily.     atorvastatin (LIPITOR) 40 MG tablet 40 mg bedtime.        Allergies:   Patient has no known allergies.    Review of Systems:   Review of Systems:   General: WNL  Eyes: WNL  Ears/Nose/Throat: WNL  Lungs: WNL  Heart: WNL  Stomach: WNL  Bladder: WNL  Muscle/Joints: WNL  Skin: WNL  Nervous System: WNL  Mental Health: WNL     Blood: WNL       Objective:      Physical Exam  @LASTENCWT:3@  5' 10\" (1.778 m)  @BMI:3@  /78 (Patient Site: Left Arm, Patient Position: Sitting, Cuff Size: Adult Regular)   Pulse (!) 57   Resp 10   Ht 5' 10\" (1.778 m)   Wt 195 lb (88.5 kg)   BMI 27.98 kg/m      General Appearance:   Alert, cooperative and in no acute distress.   HEENT:  No scleral icterus; the mucous membranes were pink and moist.   Neck: JVP flat. No thyromegaly. No HJR   Chest: The spine was straight. The chest was symmetric.   Lungs:   Respirations unlabored; the lungs are clear to auscultation.   Cardiovascular:   S1 and S2 normal and with mild diastolic murmur. No clicks or rubs. No carotid bruits noted. Right DP, PT, and radial pulses 2+. Left DP, PT, and radial pulses 2+.   Abdomen:  No organomegaly, masses, bruits, or tenderness. Bowels sounds are present   Extremities: No cyanosis, clubbing, or edema.   Skin: No xanthelasma.   Neurologic: Mood and affect are appropriate.         Lab Review   Lab Results   Component Value Date     04/02/2018     11/09/2017     10/11/2016    K 3.7 04/02/2018    K 3.6 11/09/2017    K 3.8 10/11/2016     04/02/2018     11/09/2017     10/11/2016    CO2 24 04/02/2018    CO2 31 11/09/2017 "    CO2 29 10/11/2016    BUN 24 (H) 04/02/2018    BUN 21 11/09/2017    BUN 14 10/11/2016    CREATININE 1.06 04/02/2018    CREATININE 0.95 11/09/2017    CREATININE 0.92 10/11/2016    CALCIUM 8.9 04/02/2018    CALCIUM 9.2 11/09/2017    CALCIUM 9.4 10/11/2016     Lab Results   Component Value Date    WBC 9.1 09/10/2014    HGB 15.0 09/10/2014    HGB 13.6 (L) 03/08/2012    HCT 45.0 09/10/2014    MCV 93 09/10/2014     09/10/2014     03/07/2012     Lab Results   Component Value Date    CHOL 156 11/09/2017    CHOL 145 10/11/2016    CHOL 138 03/25/2015    TRIG 89 11/09/2017    TRIG 55 10/11/2016    TRIG 105 03/25/2015    HDL 56 11/09/2017    HDL 60 10/11/2016    HDL 51 03/25/2015     No results found for: BNP      Nayana Gomes M.D.

## 2021-06-26 ENCOUNTER — HEALTH MAINTENANCE LETTER (OUTPATIENT)
Age: 71
End: 2021-06-26

## 2021-06-28 NOTE — PROGRESS NOTES
Progress Notes by Nayana Gomes MD at 1/22/2020  8:10 AM     Author: Nayana Gomes MD Service: -- Author Type: Physician    Filed: 1/22/2020  8:49 AM Encounter Date: 1/22/2020 Status: Signed    : Nayana Gomes MD (Physician)         Thank you, Dr. Tena, for asking the [facility] Heart Care team to see Mr. Satish Lang to evaluate heart disease.      Assessment/Recommendations   Assessment/Plan:  Asymptomatic aortic ectasia, mild/trace AI, coronary calcification.    Aortic ectasia - stable on CT this year. BP controlled. On statin. Plan repeat CTA in 1 year.    Coronary calcification - due for lipid check at his annual this spring. LDL was 68 last year on statin. Diet and exercise recommendations made.    Lipids - as above.    HTN - controlled on amlodipine    F/U 1 year       History of Present Illness/Subjective    Mr. Satish Lang is a 70 y.o. male with an extensive family history of CAD without warning symptoms and a personal history of thoracic aortic ectasia. In 2014 he had a coronary CTA with a calcium score in the 600s, which put him in the 75-90% risk group for 10 years. The calcium was primarily in the mid LAD. He also had an echo consistent with moderate RV dysfunction in the setting of chest pain in 2014 but the repeat echo 2015 was read as normal with mild AI. Stress echo 11/2016 was 11:00 with EKG strain but no wall motion abnormalities. CTA chest in serial has shown a slow growing ascending aortic ectasia/aneurysm that was 4.5 cm fall 2018 but measured 4.2 on the December 2019 images.      Satish Lang returns for f/u today. He tore his right achilles tendon while hunting this fall and had surgery in November but has had trouble with some staple infection. He has been not exercising as a result. There has been no chest pain, dyspnea, palpitations, lightheadedness, syncope. He has had swelling of that right ankle/foot, but nothing on the left.         Physical  Examination Review of Systems   Vitals:    01/22/20 0815   BP: 106/70   Pulse: 72   SpO2: (!) 14%     Body mass index is 28.41 kg/m .  Wt Readings from Last 3 Encounters:   01/22/20 198 lb (89.8 kg)   12/03/18 195 lb (88.5 kg)   10/26/17 192 lb (87.1 kg)     [unfilled]  General Appearance:   no distress, normal body habitus   ENT/Mouth: membranes moist, no oral lesions or bleeding gums.      EYES:  no scleral icterus, normal conjunctivae   Neck: no carotid bruits or thyromegaly   Chest/Lungs:   lungs are clear to auscultation, no rales or wheezing, no sternal scar, equal chest wall expansion    Cardiovascular:   Regular. Normal first and second heart sounds with diastolic murmur at the upper sternal borders. No rubs or gallops. The right radial, dorsalis pedis and posterior tibial pulses are intact.  The left radial, dorsalis pedis and posterior tibial pulses are intact. Jugular venous pressure flat, right ankle edema (surgical foot), no left edema. Good pulses.   Abdomen:  no organomegaly, masses, bruits, or tenderness; bowel sounds are present   Extremities: no cyanosis or clubbing   Skin: no xanthelasma, warm.    Neurologic: normal  bilateral, no tremors     Psychiatric: alert and oriented x3, calm     General: WNL  Eyes: WNL  Ears/Nose/Throat: WNL  Lungs: WNL  Heart: WNL  Stomach: WNL  Bladder: WNL  Muscle/Joints: WNL  Skin: Poor Wound Healing  Nervous System: Falls  Mental Health: WNL     Blood: WNL     Medical History  Surgical History Family History Social History   Past Medical History:   Diagnosis Date   ? Hypercholesterolemia    ? Hypertension    ? Prostate cancer (H)     Past Surgical History:   Procedure Laterality Date   ? PROSTATECTOMY      Family History   Problem Relation Age of Onset   ? Hyperlipidemia Mother    ? Heart disease Mother    ? Cancer Mother    ? Heart disease Father    ? Coronary artery disease Brother         PTCA with stenting   ? Coronary artery disease Brother         PTCA with  stenting   ? Coronary artery disease Sister         PTCA with stenting    Social History     Socioeconomic History   ? Marital status:      Spouse name: Not on file   ? Number of children: Not on file   ? Years of education: Not on file   ? Highest education level: Not on file   Occupational History   ? Not on file   Social Needs   ? Financial resource strain: Not on file   ? Food insecurity:     Worry: Not on file     Inability: Not on file   ? Transportation needs:     Medical: Not on file     Non-medical: Not on file   Tobacco Use   ? Smoking status: Never Smoker   Substance and Sexual Activity   ? Alcohol use: Yes     Comment: wine 2x/week   ? Drug use: No   ? Sexual activity: Not on file   Lifestyle   ? Physical activity:     Days per week: Not on file     Minutes per session: Not on file   ? Stress: Not on file   Relationships   ? Social connections:     Talks on phone: Not on file     Gets together: Not on file     Attends Nondenominational service: Not on file     Active member of club or organization: Not on file     Attends meetings of clubs or organizations: Not on file     Relationship status: Not on file   ? Intimate partner violence:     Fear of current or ex partner: Not on file     Emotionally abused: Not on file     Physically abused: Not on file     Forced sexual activity: Not on file   Other Topics Concern   ? Not on file   Social History Narrative    The patient is employed.           Medications  Allergies   Current Outpatient Medications   Medication Sig Dispense Refill   ? amLODIPine-valsartan-hcthiazid 5-160-12.5 mg Tab Take 1 tablet by mouth daily. 40-10-25MG DAILY     ? aspirin 81 MG EC tablet Take 81 mg by mouth daily.     ? atorvastatin (LIPITOR) 40 MG tablet 40 mg bedtime.        No current facility-administered medications for this visit.     No Known Allergies      Lab Results    Chemistry/lipid CBC Cardiac Enzymes/BNP/TSH/INR   Lab Results   Component Value Date    CHOL 146 04/17/2019     HDL 61 04/17/2019    LDLCALC 68 04/17/2019    TRIG 85 04/17/2019    CREATININE 1.09 11/19/2019    BUN 17 11/19/2019    K 3.2 (L) 11/19/2019     11/19/2019     11/19/2019    CO2 30 11/19/2019    Lab Results   Component Value Date    WBC 9.1 09/10/2014    HGB 15.0 09/10/2014    HCT 45.0 09/10/2014    MCV 93 09/10/2014     09/10/2014    Lab Results   Component Value Date    TROPONINI 0.02 09/10/2014    TSH 1.08 03/25/2016

## 2021-06-29 NOTE — PROGRESS NOTES
Progress Notes by Suzanne Zelaya MD at 10/1/2020  8:45 AM     Author: Suzanne Zelaya MD Service: -- Author Type: Physician    Filed: 10/1/2020 12:32 PM Encounter Date: 10/1/2020 Status: Signed    : Suzanne Zelaya MD (Physician)             Date of Service: 10/01/2020     Date last seen by Dr. Zelaya:  2020    PCP: Wiliam Tena MD    Impression:     1. Right leg swelling  2. Acquired lymphedema  3. Fibrosis and scarring  4. Right inguinal lymphadenopathy  5. High arches  6. Prostate CA (2012  Prostatectomy with LND)     Plan:  1. Type of swelling was reviewed in detail with the patient.  Questions were answered and education was completed.  2. Prostate MRI and Venous insufficiency study reviewed.  3. Continue exercise, compression and bandaging as needed.   4. OTC good insole information given again to protect feet, ankles and skeletal system.    5. OTC compression info given with discussion on wearing and replacing.  6. Patient will follow up in 6 months, or when needed.  If any questions or concerns they are to contact the clinic.    Time spent with patient 25 minutes with greater than 50% time in consultation, education and coordination of care, excluding procedures.     ---------------------------------------------------------------------------------------------------------------------    Chief Complaint: Right leg swelling     History of Present Illness:     Satish Lang returns to the [facility] for right leg swelling due to acquired lymphedema with fibrosis and scarring with history of prostate cancer treated 2012 with robotic prostatectomy with bilateral pelvic lymph node dissection per  complicated by underlying thoracic aortic ectasia, coronary artery calcification, mild aortic insufficiency. The swelling significantly worsened after right achilles tendon rupture Oct. 2019 with repair in December  complicated by infection.  At his initial visit with me right inguinal adenopathy was noted with venous changes.  Venous insufficiency study and prostate MRI were ordered. These were both normal for him.  He was sent to therapy and them to get compression.  OTC compression recommendations were also provided.  Therapy reported he did well with decreased swelling.  Today he reports the swelling is much better and he is controlling it well.  There is no pain.  He wears his compression socks daily.  There has been no new numbness, tingling or weakness.  There have been no new masses, rashes, or swellings of any other joints. There has been no new decrease in appetite, unexplained weight loss, abdominal bloating and bowel or bladder changes.    Past Medical History:   Diagnosis Date   ? Achilles tendon rupture 2019   ? Aortic ectasia (H)    ? Cataracts, bilateral    ? Hypercholesterolemia    ? Hypertension    ? Prostate cancer (H)    ? Thoracic aortic ectasia (H)     [facility]   ? Tinnitus        Past Surgical History:   Procedure Laterality Date   ? ACHILLES TENDON REPAIR     ? PROSTATECTOMY  2012   ? right inguinal hernia repair  1984         Current Outpatient Medications:   ?  amlodipine-olmesartan (TAO) 10-40 mg per tablet, Take 1 tablet by mouth daily., Disp: , Rfl:   ?  atorvastatin (LIPITOR) 40 MG tablet, 40 mg bedtime. , Disp: , Rfl:   ?  meloxicam (MOBIC) 7.5 MG tablet, Take 1 tablet by mouth daily., Disp: , Rfl:     Allergies   Allergen Reactions   ? Perindopril Erbumine Unknown   ? Vardenafil Unknown       Social History     Socioeconomic History   ? Marital status:      Spouse name: Not on file   ? Number of children: Not on file   ? Years of education: Not on file   ? Highest education level: Not on file   Occupational History   ? Not on file   Social Needs   ? Financial resource strain: Not on file   ? Food insecurity     Worry: Not on file     Inability: Not on file   ? Transportation  needs     Medical: Not on file     Non-medical: Not on file   Tobacco Use   ? Smoking status: Never Smoker   ? Smokeless tobacco: Never Used   Substance and Sexual Activity   ? Alcohol use: Yes     Comment: wine 2x/week   ? Drug use: No   ? Sexual activity: Not on file   Lifestyle   ? Physical activity     Days per week: Not on file     Minutes per session: Not on file   ? Stress: Not on file   Relationships   ? Social connections     Talks on phone: Not on file     Gets together: Not on file     Attends Jain service: Not on file     Active member of club or organization: Not on file     Attends meetings of clubs or organizations: Not on file     Relationship status: Not on file   ? Intimate partner violence     Fear of current or ex partner: Not on file     Emotionally abused: Not on file     Physically abused: Not on file     Forced sexual activity: Not on file   Other Topics Concern   ? Not on file   Social History Narrative    The patient is employed.        Family History   Problem Relation Age of Onset   ? Hyperlipidemia Mother    ? Heart disease Mother    ? Cancer Mother    ? Heart disease Father    ? Coronary artery disease Brother         PTCA with stenting   ? Coronary artery disease Brother         PTCA with stenting   ? Coronary artery disease Sister         PTCA with stenting       Review of Systems:    Satish JIMENEZ Felt no new numbess, tingling or weakness, redness or rashes, fevers, new masses, unexplained weight loss, increased pain, new ulcers, shortness of breath and chest pain  Full 12 point review of systems was completed.    Imaging:    I personally reviewed the following imaging results today and those on care everywhere, if indicated    EXAM DATE:         07/09/2020     EXAM: MRI PELVIS PROSTATE PROTOCOL  LOCATION: [facility]  DATE/TIME: 7/9/2020 8:00 PM     INDICATION: RIGHT LEG SWELLING, HISTORY OF PROSTATE CANCER  COMPARISON: None.     TECHNIQUE: Routine MRI prostate  protocol including T1, diffusion, thin section  high resolution T2 and dynamic T1 thin section with IV contrast. Study was  processed using [email or web address] multiparametric computer-aided detection system to  optimize radiologist interpretation by generating multiplanar and 3D  reconstructions and creating subtraction images from the dynamic contrast data.  CONTRAST: 18ML OF IV DOTAREM WAS ADMINISTERED FROM A SINGLE USE VIAL WITH 2ML  DISCARDED. SPR iSTAT Cr=1.1 mg/dl, eGFR=66. 1mg of Glucagon.  MEDICATIONS: Glucagon 1 mg IV     FINDINGS:  Status post prostatectomy. No residual or recurrent diffusion restricting soft  tissue identified at the vesicourethral junction or near the prostatectomy bed.  No lymphadenopathy. No bone lesions.     There is colonic diverticulosis.     IMPRESSION:  1.  Prostatectomy. No abnormal soft tissue identified.  2.  No lymphadenopathy.  3.  No bone lesions.      Date: 7/8/2020 Department: [facility] Released By: Viviane Alicea Authorizing: Suzanne Zelaya MD   Study Result    Venous Insufficiency Ultrasound     Bilateral Lower Extremities       Indication:  Surveillance of bilateral leg pain and swelling     Previous: none     Patient History: Swelling     Presenting Symptoms:  Swelling, Varicose Veins and Pain     Technique:   Supine and Upright Ultrasound of the Deep and Superficial Veins with Valsalva and Compression Augmentation Maneuvers. Duplex Imaging is performed utilizing gray-scale, Two-dimensional images, color-flow imaging, Doppler waveform analysis, and Spectral doppler imaging done with provacative maneuvers.      Incompetency Criteria:  Deep vein reflux reported when greater than 1000 msec flow reversal. Superficial vein reflux reported when equal to or greater than 600 msec flow reversal.  vein reflux reported as greater than 350 msec flow reversal.      Right  Leg Deep Veins    CFV SFJ PFV PROX SFV   PROX SFV MID SFV DIST  POP V. PERON.   V. PTV'S   Compressibility  (FC,PC,NC) FC FC FC FC FC FC FC FC FC   Spontaneous +   + + + + +   +   Phasic  +   + + + + +   +   Augmentation +   + + + + +   +   Reflux -   - - - - -   -         Right Leg Saphenous Veins  Location SFJ PROX THIGH KNEE MID CALF SPJ PROX CALF MID CALF   RT GSV (mm) 6 5 5 5         Reflux - - - -         RT SSV (mm)         5 5 3   Reflux         - - -      Left Leg Deep Veins    CFV SFJ PFV PROX SFV PROX SFV MID SFV DIST POP V. PERON. V. PTV'S   Compressibility  (FC,PC,NC) FC FC FC FC FC FC FC FC FC   Spontaneous +   + + + + +   +   Phasic  +   + + + + +   +   Augmentation +   + + + + +   +   Reflux -   - - - - -   -         Lt Leg Saphenous Veins   Location SFJ PROX THIGH KNEE MID CALF SPJ PROX CALF MID CALF   LT GSV (mm) 5 6 4 4         Reflux - - - -         LT SSV (mm)         3 2 2   Reflux         - - -         Compression Study:  Normal     Comments: Normal study.     Impression:       Right Deep Vein Findings: Patent and without reflux     Left Deep Vein Findings: Patent without reflux     Superficial Vein Findings:      Right Greater Saphenous vein: Patent and without reflux     Right Small Saphenous Vein: Patent and without reflux     Left Greater Saphenous Vein: Patent and without reflux     Left Small Saphenous Vein: Patent and without reflux       Labs:    I personally reviewed the following lab results today and those on care everywhere, if indicated    No results found for: SEDRATE      No results found for: CRP        Lab Results   Component Value Date    CREATININE 0.93 08/13/2020      No results found for: HGBA1C        Lab Results   Component Value Date    BUN 15 08/13/2020              Lab Results   Component Value Date    ALBUMIN 3.9 06/29/2020       No results found for: [id number]    Lab Results   Component Value Date    TSH 1.08 03/25/2016     Lab Results   Component Value Date    WBC 9.1 09/10/2014    HGB 15.0 09/10/2014    HCT 45.0 09/10/2014     MCV 93 09/10/2014     09/10/2014         Physical Exam:  Vitals:    10/01/20 0858   BP: 118/82   Pulse: 68   Resp: 20   Temp: 98.2  F (36.8  C)      BMI 27.55  Weight 192 pounds     Circumferential measures:    Vasc Edema 6/30/2020 10/1/2020   Right just above MTP 24 25   Right Ankle 25.7 25.8   Right Widest Calf 34.2 34.5   Right Thigh Up 10cm 43 43.5   Left - just above MTP 23.5 24.3   Left Ankle 22.5 23.7   Left Widest Calf 33.5 34   Left Thigh Up 10cm 41 41.5      Stable    General:  70 y.o. male in no apparent distress.      Psych: Alert and oriented x 3.  Cooperative. Affect normal.    HEENT: Atraumatic, normocephalic    Abdominal: Normal bowel sounds without any pain, guarding or rigidity. No inguinal lymphadenopathy palpated today.     Musculoskeletal:  Normal range of motion in knees and ankles bilaterally without active joint synovitis, erythema, swelling or joint laxity.     Neurological:  Sensation is intact to pin prick and light touch in both legs.  Strength testing is normal in knee flexion, knee extension, ankle dorsiflexion and great toe extension bilaterally.       Vascular: Dorsalis pedis and posterior tibialis pulses are strong and equal bilaterally.  There are no significant telangietasias, medial ankle venous flares and spider veins .      Integumentary: Skin of the legs is uniformly warm and dry, and hyperpigmentated and with positive Stemmer's Sign very slight on right.  Nails are thickened and discolored.  Decreased fibrosis right medial posterior ankle. No pain to palpation of right ankle.     Suzanne Zelaya MD, Founding Diplomate ABWMS, FACCWS, FAAPMR  Medical Director Wound Care and Lymphedema  [facility]  [phone]      This note was dictated using a voice recognition software.  Any grammatical or context distortion are unintentional and inherent to the software.

## 2021-06-30 NOTE — PROGRESS NOTES
Progress Notes by Nayana Gomes MD at 1/26/2021 12:50 PM     Author: Nayana Gomes MD Service: -- Author Type: Physician    Filed: 1/26/2021  2:09 PM Encounter Date: 1/26/2021 Status: Signed    : Nayana Gomes MD (Physician)         Thank you, Dr. Tena, for asking the [facility] team to see Mr. Satish Lang to evaluate heart disease.      Assessment/Recommendations   Assessment/Plan:    Aortic ectasia - stable on several years of serial studies. Will switch to Q2 year CTA at this time. BP and lipid control.     Coronary calcification - asymptomatic. On aspirin and high intensity statin. Mediterranean diet with limited sugars discussed.     Hyperlipidemia - check today    HTN - controlled    F/U 2 years with CTA at that time.       History of Present Illness/Subjective    Mr. Satish Lang is a 71 y.o. male with an extensive family history of CAD without warning symptoms and a personal history of thoracic aortic ectasia. In 2014 he had a coronary CTA with a calcium score in the 600s, which put him in the 75-90% risk group for 10 years. The calcium was primarily in the mid LAD. He also had an echo consistent with moderate RV dysfunction in the setting of chest pain in 2014 but the repeat echo 2015 was read as normal with mild AI. Stress echo 11/2016 was 11:00 with EKG strain but no wall motion abnormalities. CTA chest in serial has shown a slow growing ascending aortic ectasia/aneurysm that was 4.5 cm fall 2018 but measured 4.2 on the December 2019 images. CTA 1/2021 was measured at 4.4 x 4.4 cm. Echo 1/2021 EF was 64% with mild TR.      Satish Lang returns for f/u today. He has been in quarantine at their cabin near [facility]. These are skate skiing and snowshoeing regularly and he feels well doing this exercise. There is no chest pain, palpitations or unexpected dyspnea.          Physical Examination Review of Systems   Vitals:    01/26/21 1249   BP: 130/80   Pulse: 60    Resp: 16   SpO2: 97%     Body mass index is 27.84 kg/m .  Wt Readings from Last 3 Encounters:   01/26/21 194 lb (88 kg)   01/22/21 192 lb (87.1 kg)   10/01/20 192 lb (87.1 kg)     [unfilled]  General Appearance:   no distress, normal body habitus   ENT/Mouth: membranes moist, no oral lesions or bleeding gums.      EYES:  no scleral icterus, normal conjunctivae   Neck: no carotid bruits or thyromegaly   Chest/Lungs:   lungs are clear to auscultation, no rales or wheezing, no sternal scar, equal chest wall expansion    Cardiovascular:   Regular. Normal first and second heart sounds with no murmurs, rubs, or gallops. The right radial, dorsalis pedis and posterior tibial pulses are intact.  The left radial, dorsalis pedis and posterior tibial pulses are intact. Jugular venous pressure flat, no edema bilaterally    Abdomen:  no organomegaly, masses, bruits, or tenderness; bowel sounds are present   Extremities: no cyanosis or clubbing   Skin: no xanthelasma, warm.    Neurologic: normal  bilateral, no tremors     Psychiatric: alert and oriented x3, calm     General: WNL  Eyes: WNL  Ears/Nose/Throat: WNL  Lungs: WNL  Heart: WNL  Stomach: WNL  Bladder: WNL  Muscle/Joints: WNL  Skin: WNL  Nervous System: WNL  Mental Health: WNL     Blood: WNL     Medical History  Surgical History Family History Social History   Past Medical History:   Diagnosis Date   ? Achilles tendon rupture 2019   ? Aortic ectasia (H)    ? Cataracts, bilateral    ? Hypercholesterolemia    ? Hypertension    ? Prostate cancer (H)    ? Thoracic aortic ectasia (H)     [facility]   ? Tinnitus     Past Surgical History:   Procedure Laterality Date   ? ACHILLES TENDON REPAIR     ? PROSTATECTOMY  2012   ? right inguinal hernia repair  1984    Family History   Problem Relation Age of Onset   ? Hyperlipidemia Mother    ? Heart disease Mother    ? Cancer Mother    ? Heart disease Father    ? Coronary artery disease Brother         PTCA with stenting   ?  Coronary artery disease Brother         PTCA with stenting   ? Coronary artery disease Sister         PTCA with stenting    Social History     Socioeconomic History   ? Marital status:      Spouse name: Not on file   ? Number of children: Not on file   ? Years of education: Not on file   ? Highest education level: Not on file   Occupational History   ? Not on file   Social Needs   ? Financial resource strain: Not on file   ? Food insecurity     Worry: Not on file     Inability: Not on file   ? Transportation needs     Medical: Not on file     Non-medical: Not on file   Tobacco Use   ? Smoking status: Never Smoker   ? Smokeless tobacco: Never Used   Substance and Sexual Activity   ? Alcohol use: Yes     Comment: wine 2x/week   ? Drug use: No   ? Sexual activity: Not on file   Lifestyle   ? Physical activity     Days per week: Not on file     Minutes per session: Not on file   ? Stress: Not on file   Relationships   ? Social connections     Talks on phone: Not on file     Gets together: Not on file     Attends Judaism service: Not on file     Active member of club or organization: Not on file     Attends meetings of clubs or organizations: Not on file     Relationship status: Not on file   ? Intimate partner violence     Fear of current or ex partner: Not on file     Emotionally abused: Not on file     Physically abused: Not on file     Forced sexual activity: Not on file   Other Topics Concern   ? Not on file   Social History Narrative    The patient is employed.           Medications  Allergies   Current Outpatient Medications   Medication Sig Dispense Refill   ? amlodipine-olmesartan (TAO) 10-40 mg per tablet Take 1 tablet by mouth daily.     ? atorvastatin (LIPITOR) 40 MG tablet 40 mg bedtime.      ? meloxicam (MOBIC) 7.5 MG tablet Take 1 tablet by mouth daily.     ? terbinafine HCL (LAMISIL) 250 mg tablet Take 250 mg by mouth every 30 (thirty) days. TAKE 1 TAB ONCE A MONTH     ? amlodipine-olmesartan  (TAO) 10-40 mg per tablet 1 tab(s)     ? atorvastatin (LIPITOR) 40 MG tablet 1 tab(s)     ? meloxicam (MOBIC) 7.5 MG tablet 1 tab(s)     ? valACYclovir (VALTREX) 1000 MG tablet TAKE 2 TABS BY MOUTH AT ONSET OF COLD SORE & 2ND 2 TABS 12 HOURS LATER.       No current facility-administered medications for this visit.     Allergies   Allergen Reactions   ? Perindopril Erbumine Unknown   ? Vardenafil Unknown         Lab Results    Chemistry/lipid CBC Cardiac Enzymes/BNP/TSH/INR   Lab Results   Component Value Date    CHOL 146 04/17/2019    HDL 61 04/17/2019    LDLCALC 68 04/17/2019    TRIG 85 04/17/2019    CREATININE 0.93 08/13/2020    BUN 15 08/13/2020    K 3.9 08/13/2020     08/13/2020     08/13/2020    CO2 27 08/13/2020    Lab Results   Component Value Date    WBC 9.1 09/10/2014    HGB 15.0 09/10/2014    HCT 45.0 09/10/2014    MCV 93 09/10/2014     09/10/2014    Lab Results   Component Value Date    TROPONINI 0.02 09/10/2014    TSH 1.08 03/25/2016

## 2021-06-30 NOTE — PROGRESS NOTES
Progress Notes by Suzanne Zelaya MD at 4/21/2021  9:15 AM     Author: Suzanne Zelaya MD Service: -- Author Type: Physician    Filed: 4/21/2021  4:52 PM Encounter Date: 4/21/2021 Status: Signed    : Suzanne Zelaya MD (Physician)             Date of Service: 04/21/2021     Date last seen by Dr. Zelaya:  10/01/2020    PCP: Enrique Figueroa MD    Impression:     1. Right leg swelling  2. Acquired lymphedema  3. Fibrosis and scarring  4. Prostate CA (2012  Prostatectomy with LND)     Plan:  1. Type of swelling was reviewed in detail with the patient.  Questions were answered and education was completed.  2. Continue exercise, compression, insoles and bandaging as needed. Reviewed regular replacement.    3. Patient will follow up in 1 year, or when needed. The patient will watch for any increased redness, pain, temperature, drainage and/or any new ulcerations in the leg(s).  If he has any questions or concerns they are to contact the clinic.    On day of encounter time spent in chart review and with patient in consultation, exam, education and coordination of care:  31 minutes    ---------------------------------------------------------------------------------------------------------------------    Chief Complaint: Right leg swelling     History of Present Illness:     Satish Lang returns to the [facility] for right leg swelling due to acquired lymphedema with fibrosis and scarring with history of prostate cancer treated March 7, 2012 with robotic prostatectomy with bilateral pelvic lymph node dissection per  complicated by underlying thoracic aortic ectasia, coronary artery calcification, mild aortic insufficiency. The swelling significantly worsened after right achilles tendon rupture Oct. 2019 with repair in December complicated by infection.  At his initial visit with me right inguinal adenopathy was noted with venous changes.   Venous insufficiency study and prostate MRI were ordered. These were both normal without evidence of obstruction.  He was sent to therapy followed by compression.  OTC compression recommendations were provided.  Therapy reported he did well with decreased swelling.  At follow up he was doing well and it was recommended he continue his exercises, daily compression with updates and insoles (for high arches).   Today he reports the swelling is controlled without pain.  He wears his compression socks daily.  He is doing well and has been healthy.  There has been no new numbness, tingling or weakness.  There have been no new masses, rashes, or swellings of any other joints. There has been no new decrease in appetite, unexplained weight loss, abdominal bloating and bowel or bladder changes.    Past Medical History:   Diagnosis Date   ? Achilles tendon rupture 2019   ? Aortic ectasia (H)    ? Cataracts, bilateral    ? Hypercholesterolemia    ? Hypertension    ? Prostate cancer (H)    ? Thoracic aortic ectasia (H)     [facility]   ? Tinnitus        Past Surgical History:   Procedure Laterality Date   ? ACHILLES TENDON REPAIR     ? PROSTATECTOMY  2012   ? right inguinal hernia repair  1984         Current Outpatient Medications:   ?  amlodipine-olmesartan (TAO) 10-40 mg per tablet, Take 1 tablet by mouth daily., Disp: , Rfl:   ?  atorvastatin (LIPITOR) 40 MG tablet, 40 mg bedtime. , Disp: , Rfl:   ?  meloxicam (MOBIC) 7.5 MG tablet, Take 1 tablet by mouth daily., Disp: , Rfl:   ?  terbinafine HCL (LAMISIL) 250 mg tablet, Take 250 mg by mouth every 30 (thirty) days. TAKE 1 TAB ONCE A MONTH, Disp: , Rfl:   ?  valACYclovir (VALTREX) 1000 MG tablet, TAKE 2 TABS BY MOUTH AT ONSET OF COLD SORE & 2ND 2 TABS 12 HOURS LATER., Disp: , Rfl:     Allergies   Allergen Reactions   ? Perindopril Erbumine Unknown   ? Vardenafil Unknown       Social History     Socioeconomic History   ? Marital status:      Spouse name: Not on  file   ? Number of children: Not on file   ? Years of education: Not on file   ? Highest education level: Not on file   Occupational History   ? Not on file   Social Needs   ? Financial resource strain: Not on file   ? Food insecurity     Worry: Not on file     Inability: Not on file   ? Transportation needs     Medical: Not on file     Non-medical: Not on file   Tobacco Use   ? Smoking status: Never Smoker   ? Smokeless tobacco: Never Used   Substance and Sexual Activity   ? Alcohol use: Yes     Comment: wine 2x/week   ? Drug use: No   ? Sexual activity: Not on file   Lifestyle   ? Physical activity     Days per week: Not on file     Minutes per session: Not on file   ? Stress: Not on file   Relationships   ? Social connections     Talks on phone: Not on file     Gets together: Not on file     Attends Congregational service: Not on file     Active member of club or organization: Not on file     Attends meetings of clubs or organizations: Not on file     Relationship status: Not on file   ? Intimate partner violence     Fear of current or ex partner: Not on file     Emotionally abused: Not on file     Physically abused: Not on file     Forced sexual activity: Not on file   Other Topics Concern   ? Not on file   Social History Narrative    The patient is employed.        Family History   Problem Relation Age of Onset   ? Hyperlipidemia Mother    ? Heart disease Mother    ? Cancer Mother    ? Heart disease Father    ? Coronary artery disease Brother         PTCA with stenting   ? Coronary artery disease Brother         PTCA with stenting   ? Coronary artery disease Sister         PTCA with stenting       Review of Systems:    See HPI.    Imaging:    I personally reviewed the following imaging results today and those on care everywhere, if indicated    EXAM: CTA CHEST  LOCATION: [facility]  DATE/TIME: 1/11/2021 2:21 PM     INDICATION: Aortic disease, nontraumatic Aortic ectasia/aneurysm  COMPARISON:  CT angiogram of the chest 12/17/2019  TECHNIQUE: CT chest pulmonary angiogram during arterial phase injection of IV contrast. Multiplanar reformats and 3-D MIP reconstructions were performed. Dose reduction techniques were used.   CONTRAST: Iohexol (Omni) 100 mL     FINDINGS:  ANGIOGRAM CHEST: Main pulmonary artery is normal in size. There are no pulmonary artery filling defects. Pulmonary arteries taper normally as they extend towards the pleura. No pulmonary artery calcifications or webs.     Cardiac pulsation artifact is present at the aortic root. Sinotubular junction is preserved. Mid ascending aorta measures 4.4 x 4.4 cm. Conventional arch anatomy. The descending thoracic aorta is tortuous but normal caliber. Mild predominantly calcified   atheroma in the descending thoracic aorta. No ulcerative plaque.     There is mild narrowing of the proximal celiac axis at the level of the diaphragmatic crura with slight poststenotic dilatation, likely due to compression by the median arcuate ligament.     LUNGS AND PLEURA: Limited subpleural atelectasis in the dependent lower lobes. No airspace opacities or actionable nodules. A few small calcified nodules are present in the anterior upper lobes. Trachea and central airways are patent. There is no pleural   fluid.     MEDIASTINUM: Cardiac chambers are normal in size. Atheromatous calcifications in the central coronary arteries, mild to moderate. No pericardial effusion. No enlarged mediastinal or hilar lymph nodes. Esophagus is decompressed. Imaged thyroid gland is   normal.     UPPER ABDOMEN: Cholelithiasis. Small diverticula arise from the descending colon.     MUSCULOSKELETAL: The right lateral second rib is expanded by a medullary lesion with chondroid matrix likely a small chondroma. Minimal thoracic spine degenerative disc disease. There is a benign hemangiolipoma in T12.     IMPRESSION:      1.  Mild fusiform enlargement of the mid ascending aorta which measures  4.4 x 4.4 cm.       EXAM: MRI PELVIS PROSTATE PROTOCOL  LOCATION: [facility] [place]  DATE/TIME: 7/9/2020 8:00 PM     INDICATION: RIGHT LEG SWELLING, HISTORY OF PROSTATE CANCER  COMPARISON: None.     TECHNIQUE: Routine MRI prostate protocol including T1, diffusion, thin section  high resolution T2 and dynamic T1 thin section with IV contrast. Study was  processed using [employer] multiparametric computer-aided detection system to  optimize radiologist interpretation by generating multiplanar and 3D  reconstructions and creating subtraction images from the dynamic contrast data.  CONTRAST: 18ML OF IV DOTAREM WAS ADMINISTERED FROM A SINGLE USE VIAL WITH 2ML  DISCARDED. SPR iSTAT Cr=1.1 mg/dl, eGFR=66. 1mg of Glucagon.  MEDICATIONS: Glucagon 1 mg IV     FINDINGS:  Status post prostatectomy. No residual or recurrent diffusion restricting soft  tissue identified at the vesicourethral junction or near the prostatectomy bed.  No lymphadenopathy. No bone lesions.     There is colonic diverticulosis.     IMPRESSION:  1.  Prostatectomy. No abnormal soft tissue identified.  2.  No lymphadenopathy.  3.  No bone lesions.      Venous Insufficiency Ultrasound 7/8/2020     Bilateral Lower Extremities       Indication:  Surveillance of bilateral leg pain and swelling     Previous: none     Patient History: Swelling     Presenting Symptoms:  Swelling, Varicose Veins and Pain     Technique:   Supine and Upright Ultrasound of the Deep and Superficial Veins with Valsalva and Compression Augmentation Maneuvers. Duplex Imaging is performed utilizing gray-scale, Two-dimensional images, color-flow imaging, Doppler waveform analysis, and Spectral doppler imaging done with provacative maneuvers.      Incompetency Criteria:  Deep vein reflux reported when greater than 1000 msec flow reversal. Superficial vein reflux reported when equal to or greater than 600 msec flow reversal.  vein reflux reported as greater than 350 msec  flow reversal.      Right  Leg Deep Veins    CFV SFJ PFV PROX SFV   PROX SFV MID SFV DIST POP V. PERON.   V. PTV'S   Compressibility  (FC,PC,NC) FC FC FC FC FC FC FC FC FC   Spontaneous +   + + + + +   +   Phasic  +   + + + + +   +   Augmentation +   + + + + +   +   Reflux -   - - - - -   -         Right Leg Saphenous Veins  Location SFJ PROX THIGH KNEE MID CALF SPJ PROX CALF MID CALF   RT GSV (mm) 6 5 5 5         Reflux - - - -         RT SSV (mm)         5 5 3   Reflux         - - -      Left Leg Deep Veins    CFV SFJ PFV PROX SFV PROX SFV MID SFV DIST POP V. PERON. V. PTV'S   Compressibility  (FC,PC,NC) FC FC FC FC FC FC FC FC FC   Spontaneous +   + + + + +   +   Phasic  +   + + + + +   +   Augmentation +   + + + + +   +   Reflux -   - - - - -   -         Lt Leg Saphenous Veins   Location SFJ PROX THIGH KNEE MID CALF SPJ PROX CALF MID CALF   LT GSV (mm) 5 6 4 4         Reflux - - - -         LT SSV (mm)         3 2 2   Reflux         - - -         Compression Study:  Normal     Comments: Normal study.     Impression:       Right Deep Vein Findings: Patent and without reflux     Left Deep Vein Findings: Patent without reflux     Superficial Vein Findings:      Right Greater Saphenous vein: Patent and without reflux     Right Small Saphenous Vein: Patent and without reflux     Left Greater Saphenous Vein: Patent and without reflux     Left Small Saphenous Vein: Patent and without reflux       Labs:    I personally reviewed the following lab results today and those on care everywhere, if indicated    No results found for: SEDRATE      No results found for: CRP        Lab Results   Component Value Date    CREATININE 0.93 08/13/2020      No results found for: HGBA1C        Lab Results   Component Value Date    BUN 15 08/13/2020              Lab Results   Component Value Date    ALBUMIN 3.9 06/29/2020       No results found for: [id number]    Lab Results   Component Value Date    TSH 1.08 03/25/2016     Lab Results    Component Value Date    WBC 9.1 09/10/2014    HGB 15.0 09/10/2014    HCT 45.0 09/10/2014    MCV 93 09/10/2014     09/10/2014       Physical Exam:  Vitals:    04/21/21 0937   BP: 106/80   Pulse: 68   Resp: 22   Temp: 97.9  F (36.6  C)      BMI 26.36   Weight 189 (-3) pounds     Circumferential measures:    Vasc Edema 6/30/2020 10/1/2020 4/21/2021   Right just above MTP 24 25 25   Right Ankle 25.7 25.8 25   Right Widest Calf 34.2 34.5 35.4   Right Thigh Up 10cm 43 43.5 -   Left - just above MTP 23.5 24.3 23.3   Left Ankle 22.5 23.7 24   Left Widest Calf 33.5 34 34.5   Left Thigh Up 10cm 41 41.5 -      Measures stable.    General:  71 y.o. male in no apparent distress.      Psych: Alert and oriented x 3.  Cooperative. Affect normal.    HEENT: Atraumatic, normocephalic    Abdominal: Normal bowel sounds without any pain, guarding or rigidity. No inguinal lymphadenopathy palpated today.     Musculoskeletal:  Normal range of motion in knees and ankles bilaterally without active joint synovitis, erythema, swelling or joint laxity.     Neurological:  Sensation is intact to pin prick and light touch in both legs.  Strength testing is normal in knee flexion, knee extension, ankle dorsiflexion and great toe extension bilaterally.       Vascular: Dorsalis pedis and posterior tibialis pulses are strong and equal bilaterally.  There are no significant telangietasias, medial ankle venous flares and spider veins .      Integumentary: Skin of the legs is uniformly warm and dry, and hyperpigmentated and with positive Stemmer's Sign very slight on right.  Nails are thickened and discolored.  No pain to palpation.    Suzanne Zelaya MD, Founding Diplomate ABWMS, FACCWS, FAAPMR  Medical Director Wound Care and Lymphedema  [facility]  [phone]      This note was dictated using a voice recognition software.  Any grammatical or context distortion are unintentional and inherent to the  software.

## 2021-07-03 NOTE — ADDENDUM NOTE
Addendum Note by Graciela Blanco RN at 12/4/2019  2:54 PM     Author: Graciela Blanco RN Service: -- Author Type: Registered Nurse    Filed: 12/4/2019  2:54 PM Encounter Date: 12/4/2019 Status: Signed    : Graciela Blanco RN (Registered Nurse)    Addended by: GRACIELA BLANCO on: 12/4/2019 02:54 PM        Modules accepted: Orders

## 2021-08-02 ENCOUNTER — TRANSFERRED RECORDS (OUTPATIENT)
Dept: HEALTH INFORMATION MANAGEMENT | Facility: CLINIC | Age: 71
End: 2021-08-02
Payer: COMMERCIAL

## 2021-08-02 ENCOUNTER — LAB REQUISITION (OUTPATIENT)
Dept: LAB | Facility: CLINIC | Age: 71
End: 2021-08-02

## 2021-08-02 DIAGNOSIS — E78.5 HYPERLIPIDEMIA, UNSPECIFIED: ICD-10-CM

## 2021-08-02 DIAGNOSIS — I10 ESSENTIAL (PRIMARY) HYPERTENSION: ICD-10-CM

## 2021-08-02 LAB
ALBUMIN SERPL-MCNC: 3.8 G/DL (ref 3.5–5)
ALP SERPL-CCNC: 80 U/L (ref 45–120)
ALT SERPL W P-5'-P-CCNC: 28 U/L (ref 0–45)
ANION GAP SERPL CALCULATED.3IONS-SCNC: 9 MMOL/L (ref 5–18)
AST SERPL W P-5'-P-CCNC: 29 U/L (ref 0–40)
BILIRUB SERPL-MCNC: 0.6 MG/DL (ref 0–1)
BUN SERPL-MCNC: 15 MG/DL (ref 8–28)
CALCIUM SERPL-MCNC: 9.3 MG/DL (ref 8.5–10.5)
CHLORIDE BLD-SCNC: 106 MMOL/L (ref 98–107)
CHOLEST SERPL-MCNC: 151 MG/DL
CO2 SERPL-SCNC: 29 MMOL/L (ref 22–31)
CREAT SERPL-MCNC: 0.86 MG/DL (ref 0.7–1.3)
GFR SERPL CREATININE-BSD FRML MDRD: 87 ML/MIN/1.73M2
GLUCOSE BLD-MCNC: 95 MG/DL (ref 70–125)
HDLC SERPL-MCNC: 70 MG/DL
LDLC SERPL CALC-MCNC: 63 MG/DL
POTASSIUM BLD-SCNC: 3.4 MMOL/L (ref 3.5–5)
PROT SERPL-MCNC: 6.3 G/DL (ref 6–8)
SODIUM SERPL-SCNC: 144 MMOL/L (ref 136–145)
TRIGL SERPL-MCNC: 88 MG/DL

## 2021-08-02 PROCEDURE — 80061 LIPID PANEL: CPT | Performed by: FAMILY MEDICINE

## 2021-08-02 PROCEDURE — 80053 COMPREHEN METABOLIC PANEL: CPT | Performed by: FAMILY MEDICINE

## 2021-10-16 ENCOUNTER — HEALTH MAINTENANCE LETTER (OUTPATIENT)
Age: 71
End: 2021-10-16

## 2022-02-14 ENCOUNTER — MEDICAL CORRESPONDENCE (OUTPATIENT)
Dept: HEALTH INFORMATION MANAGEMENT | Facility: CLINIC | Age: 72
End: 2022-02-14
Payer: COMMERCIAL

## 2022-02-14 ENCOUNTER — TRANSFERRED RECORDS (OUTPATIENT)
Dept: HEALTH INFORMATION MANAGEMENT | Facility: CLINIC | Age: 72
End: 2022-02-14
Payer: COMMERCIAL

## 2022-02-14 ENCOUNTER — TRANSCRIBE ORDERS (OUTPATIENT)
Dept: OTHER | Age: 72
End: 2022-02-14
Payer: COMMERCIAL

## 2022-02-14 DIAGNOSIS — K40.90 INGUINAL HERNIA, LEFT: Primary | ICD-10-CM

## 2022-02-15 ENCOUNTER — OFFICE VISIT (OUTPATIENT)
Dept: SURGERY | Facility: CLINIC | Age: 72
End: 2022-02-15
Attending: FAMILY MEDICINE
Payer: COMMERCIAL

## 2022-02-15 VITALS — SYSTOLIC BLOOD PRESSURE: 124 MMHG | DIASTOLIC BLOOD PRESSURE: 90 MMHG

## 2022-02-15 DIAGNOSIS — K40.90 INGUINAL HERNIA, LEFT: ICD-10-CM

## 2022-02-15 PROCEDURE — 99204 OFFICE O/P NEW MOD 45 MIN: CPT | Performed by: SURGERY

## 2022-02-15 RX ORDER — CEFAZOLIN SODIUM/WATER 2 G/20 ML
2 SYRINGE (ML) INTRAVENOUS
Status: CANCELLED | OUTPATIENT
Start: 2022-03-31

## 2022-02-15 RX ORDER — CEFAZOLIN SODIUM/WATER 2 G/20 ML
2 SYRINGE (ML) INTRAVENOUS SEE ADMIN INSTRUCTIONS
Status: CANCELLED | OUTPATIENT
Start: 2022-03-31

## 2022-02-15 NOTE — PROGRESS NOTES
General Surgery H&P  Satish Lang MRN# [id number]   Age/Sex: 72 year old male YOB: 1950     Reason for visit: 1. Inguinal hernia, left            Referring physician: Enrique Figueroa MD                   Assessment and Plan:   Assessment:  1.  Left inguinal hernia -swelling is reducible.  Minimal tenderness to palpation.  2.  Right inguinal pain. Possible the patient could have scar tissue over the right side.  I do not appreciate any right-sided inguinal hernia.    Plan:  -I have offered the patient a procedure of robotic assisted laparoscopic repair of the left inguinal hernia with mesh.  Risk and benefits of procedure explained detail to the patient.  Risk include infection, bleeding, damage to the surrounding structure and possible damage to the testicular structures.    I discussed in detail with the patient regarding mesh placement.  I explained the different options of repairing the hernia.  If the hernia was repaired primarily without mesh, there is a higher chance of recurrence.  We discussed in detail using synthetic versus biologic mesh.  I explained to the different scenarios in which each mesh would be more appropriate.  Patient was allowed time to ask questions and concerns regarding mesh placement.  The patient is accepting of the use of mesh with the hernia repair.    -The patient does spend a lot of time in [place] vacationing and doing outdoors activities.  As result I will have the surgical scheduler contact the patient to determine what is the best time for the patient to undergo surgery.    -I have also educated the patient on signs and symptoms of an incarcerated/strangulated inguinal hernia.  Patient verbalizes understanding.    -If the patient continues to have right-sided inguinal pain, we can evaluate the site with ultrasound in the future.          Chief Complaint:     Chief Complaint   Patient presents with     Hernia     L ing hernia        History is obtained from the  patient    HPI:   Satish Lang is a 72 year old male who presents to the surgery clinic today for evaluation of the left-sided inguinal hernia.  Patient states that he has had this left-sided inguinal hernia for approximately a year now.  Patient does not have any significant pain over the site.  Patient has no nausea no vomiting.  He has had a mild change in his bowel movements.  States that they are not as regular as they used to be.  Patient states that the swelling over the left inguinal side does increase with straining and with activity.  Patient is able to reduce the swelling.      Patient is notable for a history of right-sided inguinal hernia repair as well as prostate cancer with robotic prostatectomy approxi-7 years ago.  The patient states that he does have some mild inguinal pain over the right side.  No swelling noted over the site though.    Patient is to undergo colonoscopy tomorrow.          Past Medical History:     Past Medical History:   Diagnosis Date     Achilles tendon rupture 2019     Aortic ectasia (H)      Cataracts, bilateral      Hypercholesterolemia      Hypertension      Prostate cancer (H)      Thoracic aortic ectasia (H)     [facility]es [facility]     Tinnitus               Past Surgical History:     Past Surgical History:   Procedure Laterality Date     OTHER SURGICAL HISTORY  1984    right inguinal hernia repair     PROSTATECTOMY  2012     REPAIR TENDON ACHILLES               Social History:    reports that he has never smoked. He has never used smokeless tobacco. He reports current alcohol use. He reports that he does not use drugs.           Family History:     Family History   Problem Relation Age of Onset     Hyperlipidemia Mother      Heart Disease Mother      Cancer Mother      Heart Disease Father      Coronary Artery Disease Brother         PTCA with stenting     Coronary Artery Disease Brother         PTCA with stenting     Coronary Artery Disease Sister         PTCA with  stenting              Allergies:     Allergies   Allergen Reactions     Perindopril Erbumine [Perindopril] Unknown     Vardenafil Unknown              Medications:     Prior to Admission medications    Medication Sig Start Date End Date Taking? Authorizing Provider   amlodipine-olmesartan (TAO) 10-40 mg per tablet [AMLODIPINE-OLMESARTAN (TAO) 10-40 MG PER TABLET] Take 1 tablet by mouth daily. 9/7/20  Yes Provider, Historical   atorvastatin (LIPITOR) 40 MG tablet [ATORVASTATIN (LIPITOR) 40 MG TABLET] 40 mg bedtime.  8/25/14  Yes Provider, Historical   meloxicam (MOBIC) 7.5 MG tablet [MELOXICAM (MOBIC) 7.5 MG TABLET] Take 1 tablet by mouth daily. 7/28/20  Yes Provider, Historical              Review of Systems:   A 12 point Review of Systems is negative other than noted in the HPI            Physical Exam:     Patient Vitals for the past 24 hrs:   BP   02/15/22 1007 (!) 124/90        [unfilled]   Constitutional:   awake, alert, cooperative, no apparent distress, and appears stated age       Eyes:   PERRL, conjunctiva/corneas clear, EOM's intact; no scleral edema or icterus noted        ENT:   Normocephalic, without obvious abnormality, atraumatic, Lips, mucosa, and tongue normal        Hematologic / Lymphatic:   No lymphadenopathy       Lungs:   Normal respiratory effort, no accessory muscle use, breath sounds bilaterally on auscultation       Cardiovascular:   Regular rate and rhythm       Abdomen:   Soft, nondistended, nontender to palpation.    Patient is noted to have a swelling over the left inguinal region.  The area is reducible.  Minimal to no tenderness to palpation over the site.  There is no skin changes over the site of swelling.    No appreciable swelling over the right inguinal region.  No tenderness to palpation.       Musculoskeletal:   No obvious swelling, bruising or deformity       Skin:   Skin color and texture normal for patient, no rashes or lesions              Data:         All  imaging studies reviewed by me.    No results found for this or any previous visit (from the past 24 hour(s)).     DO Donis Gilbert DO  General Surgeon  [facility]  [facility] - [address] [address]?  Office: [phone]  Employed by - [facility]  Pager: [phone]

## 2022-02-15 NOTE — LETTER
2/15/2022         RE: Satish Lang  [address] [facility] [address]        Dear Colleague,    Thank you for referring your patient, Satish Lang, to the [facility] AND [facility]IATRICS [facility]. Please see a copy of my visit note below.    General Surgery H&P  Satish Lang MRN# [id number]   Age/Sex: 72 year old male YOB: 1950     Reason for visit: 1. Inguinal hernia, left            Referring physician: Enrique Figueroa MD                   Assessment and Plan:   Assessment:  1.  Left inguinal hernia -swelling is reducible.  Minimal tenderness to palpation.  2.  Right inguinal pain. Possible the patient could have scar tissue over the right side.  I do not appreciate any right-sided inguinal hernia.    Plan:  -I have offered the patient a procedure of robotic assisted laparoscopic repair of the left inguinal hernia with mesh.  Risk and benefits of procedure explained detail to the patient.  Risk include infection, bleeding, damage to the surrounding structure and possible damage to the testicular structures.    I discussed in detail with the patient regarding mesh placement.  I explained the different options of repairing the hernia.  If the hernia was repaired primarily without mesh, there is a higher chance of recurrence.  We discussed in detail using synthetic versus biologic mesh.  I explained to the different scenarios in which each mesh would be more appropriate.  Patient was allowed time to ask questions and concerns regarding mesh placement.  The patient is accepting of the use of mesh with the hernia repair.    -The patient does spend a lot of time in [place] vacationing and doing outdoors activities.  As result I will have the surgical scheduler contact the patient to determine what is the best time for the patient to undergo surgery.    -I have also educated the patient on signs and symptoms of an incarcerated/strangulated inguinal hernia.  Patient  verbalizes understanding.    -If the patient continues to have right-sided inguinal pain, we can evaluate the site with ultrasound in the future.          Chief Complaint:     Chief Complaint   Patient presents with     Hernia     L ing hernia        History is obtained from the patient    HPI:   Satish Lang is a 72 year old male who presents to the surgery clinic today for evaluation of the left-sided inguinal hernia.  Patient states that he has had this left-sided inguinal hernia for approximately a year now.  Patient does not have any significant pain over the site.  Patient has no nausea no vomiting.  He has had a mild change in his bowel movements.  States that they are not as regular as they used to be.  Patient states that the swelling over the left inguinal side does increase with straining and with activity.  Patient is able to reduce the swelling.      Patient is notable for a history of right-sided inguinal hernia repair as well as prostate cancer with robotic prostatectomy approxi-7 years ago.  The patient states that he does have some mild inguinal pain over the right side.  No swelling noted over the site though.    Patient is to undergo colonoscopy tomorrow.          Past Medical History:     Past Medical History:   Diagnosis Date     Achilles tendon rupture 2019     Aortic ectasia (H)      Cataracts, bilateral      Hypercholesterolemia      Hypertension      Prostate cancer (H)      Thoracic aortic ectasia (H)     [facility]     Tinnitus               Past Surgical History:     Past Surgical History:   Procedure Laterality Date     OTHER SURGICAL HISTORY  1984    right inguinal hernia repair     PROSTATECTOMY  2012     REPAIR TENDON ACHILLES               Social History:    reports that he has never smoked. He has never used smokeless tobacco. He reports current alcohol use. He reports that he does not use drugs.           Family History:     Family History   Problem Relation Age of Onset      Hyperlipidemia Mother      Heart Disease Mother      Cancer Mother      Heart Disease Father      Coronary Artery Disease Brother         PTCA with stenting     Coronary Artery Disease Brother         PTCA with stenting     Coronary Artery Disease Sister         PTCA with stenting              Allergies:     Allergies   Allergen Reactions     Perindopril Erbumine [Perindopril] Unknown     Vardenafil Unknown              Medications:     Prior to Admission medications    Medication Sig Start Date End Date Taking? Authorizing Provider   amlodipine-olmesartan (TAO) 10-40 mg per tablet [AMLODIPINE-OLMESARTAN (TAO) 10-40 MG PER TABLET] Take 1 tablet by mouth daily. 9/7/20  Yes Provider, Historical   atorvastatin (LIPITOR) 40 MG tablet [ATORVASTATIN (LIPITOR) 40 MG TABLET] 40 mg bedtime.  8/25/14  Yes Provider, Historical   meloxicam (MOBIC) 7.5 MG tablet [MELOXICAM (MOBIC) 7.5 MG TABLET] Take 1 tablet by mouth daily. 7/28/20  Yes Provider, Historical              Review of Systems:   A 12 point Review of Systems is negative other than noted in the HPI            Physical Exam:     Patient Vitals for the past 24 hrs:   BP   02/15/22 1007 (!) 124/90        [unfilled]   Constitutional:   awake, alert, cooperative, no apparent distress, and appears stated age       Eyes:   PERRL, conjunctiva/corneas clear, EOM's intact; no scleral edema or icterus noted        ENT:   Normocephalic, without obvious abnormality, atraumatic, Lips, mucosa, and tongue normal        Hematologic / Lymphatic:   No lymphadenopathy       Lungs:   Normal respiratory effort, no accessory muscle use, breath sounds bilaterally on auscultation       Cardiovascular:   Regular rate and rhythm       Abdomen:   Soft, nondistended, nontender to palpation.    Patient is noted to have a swelling over the left inguinal region.  The area is reducible.  Minimal to no tenderness to palpation over the site.  There is no skin changes over the site of  swelling.    No appreciable swelling over the right inguinal region.  No tenderness to palpation.       Musculoskeletal:   No obvious swelling, bruising or deformity       Skin:   Skin color and texture normal for patient, no rashes or lesions              Data:         All imaging studies reviewed by me.    No results found for this or any previous visit (from the past 24 hour(s)).     DO Donis Gilbert DO  General Surgeon  [facility]  Surgery [address] [address]?  Office: [phone]  Employed by - [facility]  Pager: [phone]             Again, thank you for allowing me to participate in the care of your patient.        Sincerely,        Donis Alvarado DO

## 2022-02-17 ENCOUNTER — TELEPHONE (OUTPATIENT)
Dept: SURGERY | Facility: CLINIC | Age: 72
End: 2022-02-17
Payer: COMMERCIAL

## 2022-02-17 NOTE — TELEPHONE ENCOUNTER
Spoke with Jean escobar regarding surgery scheduling   with Dr. Alvarado   at [facility] on  date: 3/31/2022 Estimated arrival 1:00 PM    Patient was informed of the followin. Patient has been informed to consult their PCP/Cardiologist about stopping their blood thinners about a week before surgery.  2. Pre Op Physical will be done by Dr. Alvarado the day of surgery  3. Required Covid Test with in 4 days prior to surgery: Date: 3/28/2022 at 4:15pm, Location: [facility]  4. Ride required after surgery, can not use [employer] or public transportation.    Patient was informed that failure to do so may result in cancellation of surgery      Post Op: Date: 2022 at 1:00pm, Location: [facility]      Surgery Letter sent via [employer] and mail per pt request

## 2022-02-17 NOTE — TELEPHONE ENCOUNTER
Spoke with Jean who was not near his calendar at the time. He stated that he will call back to schedule once he can take a look at his calendar, they do have some travel coming up and may want to push it out further than March.

## 2022-02-17 NOTE — LETTER
We've received instruction to get you scheduled for surgery with Dr Alvarado. We have that arranged as follows:     Surgery Date: 3/31/2022  Location:  [place] [address]   Arrival Time: 1:00pm (unless instructed otherwise by the pre-op nurse)    Post op Appointment: 4/19/2022 at 1:00pm with     Prep Instructions:     1. Dr. Alvarado will do your pre op physical the day of surgery.    2. PCR-Rated COVID19 testing is required within 4 days of surgery. We have this scheduled for you at [facility], [address] [place] [address], 1st Floor on 3/28/2022 at 4:15pm. Follow the specific instructions you receive by So[facility]. If your test is positive, your surgery will be canceled.     3. Nothing to eat or drink for 8 hours before surgery unless instructed differently by the pre-op nurse.    4. Your surgeon prefers no blood thinners including aspirin for one week prior to surgery but you must verify this is safe for you with your prescribing doctor before stopping.     5. Visitor restrictions are in place due to the pandemic. One visitor is allowed for adult surgical patients. Please verify this with the pre-op nurse when they call before surgery because it is subject to change.    6. If you are going home the same day of surgery, you need an adult to drive you home and stay with you 24 hours after surgery.  You cannot use public transportation or medi cab services.    7. You will be screened for high-risk exposure to Covid-19 during the pre-op call.  We encourage you to quarantine yourself away from any Covid-19 people for 10 days before surgery to avoid possible last minute cancellations.   When you arrive to the hospital, you will again be screened for COVID19 symptoms. If you screen positive, your surgery will need to be postponed.     8. The community is experiencing a surge in COVID19 hospital admissions which is impacting bed availability at all hospitals. If you are being admitted  overnight or longer following surgery, please be aware that your procedure could be cancelled as a result.     9. We always encourage you to notify your insurance any time you have something scheduled including surgery. The number is usually right on the back of your insurance card. Please call [facility] Cost of Care at [phone] if you need pricing information.     10. A Pre-op call nurse will call you the day before surgery to go over more details with you to prepare for surgery.    Call our office if you have any questions! Thank you!

## 2022-02-21 DIAGNOSIS — Z11.59 ENCOUNTER FOR SCREENING FOR OTHER VIRAL DISEASES: Primary | ICD-10-CM

## 2022-03-28 ENCOUNTER — LAB (OUTPATIENT)
Dept: LAB | Facility: CLINIC | Age: 72
End: 2022-03-28
Payer: COMMERCIAL

## 2022-03-28 DIAGNOSIS — Z11.59 ENCOUNTER FOR SCREENING FOR OTHER VIRAL DISEASES: ICD-10-CM

## 2022-03-28 PROCEDURE — U0003 INFECTIOUS AGENT DETECTION BY NUCLEIC ACID (DNA OR RNA); SEVERE ACUTE RESPIRATORY SYNDROME CORONAVIRUS 2 (SARS-COV-2) (CORONAVIRUS DISEASE [COVID-19]), AMPLIFIED PROBE TECHNIQUE, MAKING USE OF HIGH THROUGHPUT TECHNOLOGIES AS DESCRIBED BY CMS-2020-01-R: HCPCS

## 2022-03-28 PROCEDURE — U0005 INFEC AGEN DETEC AMPLI PROBE: HCPCS

## 2022-03-29 LAB — SARS-COV-2 RNA RESP QL NAA+PROBE: NEGATIVE

## 2022-03-30 RX ORDER — VALACYCLOVIR HYDROCHLORIDE 1 G/1
2000 TABLET, FILM COATED ORAL 2 TIMES DAILY
COMMUNITY
End: 2023-04-24

## 2022-03-31 ENCOUNTER — ANESTHESIA EVENT (OUTPATIENT)
Dept: SURGERY | Facility: HOSPITAL | Age: 72
End: 2022-03-31
Payer: COMMERCIAL

## 2022-03-31 ENCOUNTER — ANESTHESIA (OUTPATIENT)
Dept: SURGERY | Facility: HOSPITAL | Age: 72
End: 2022-03-31
Payer: COMMERCIAL

## 2022-03-31 ENCOUNTER — DOCUMENTATION ONLY (OUTPATIENT)
Dept: OTHER | Facility: CLINIC | Age: 72
End: 2022-03-31
Payer: COMMERCIAL

## 2022-03-31 ENCOUNTER — HOSPITAL ENCOUNTER (OUTPATIENT)
Facility: HOSPITAL | Age: 72
Discharge: HOME | End: 2022-03-31
Attending: SURGERY | Admitting: SURGERY
Payer: COMMERCIAL

## 2022-03-31 VITALS
WEIGHT: 187.7 LBS | RESPIRATION RATE: 12 BRPM | BODY MASS INDEX: 26.18 KG/M2 | OXYGEN SATURATION: 94 % | HEART RATE: 75 BPM | DIASTOLIC BLOOD PRESSURE: 84 MMHG | TEMPERATURE: 99.3 F | SYSTOLIC BLOOD PRESSURE: 134 MMHG

## 2022-03-31 DIAGNOSIS — G89.18 POSTOPERATIVE PAIN: Primary | ICD-10-CM

## 2022-03-31 PROCEDURE — C1781 MESH (IMPLANTABLE): HCPCS | Performed by: SURGERY

## 2022-03-31 PROCEDURE — 250N000009 HC RX 250

## 2022-03-31 PROCEDURE — 250N000011 HC RX IP 250 OP 636: Performed by: NURSE ANESTHETIST, CERTIFIED REGISTERED

## 2022-03-31 PROCEDURE — 710N000009 HC RECOVERY PHASE 1, LEVEL 1, PER MIN: Performed by: SURGERY

## 2022-03-31 PROCEDURE — 999N000141 HC STATISTIC PRE-PROCEDURE NURSING ASSESSMENT: Performed by: SURGERY

## 2022-03-31 PROCEDURE — 710N000012 HC RECOVERY PHASE 2, PER MINUTE: Performed by: SURGERY

## 2022-03-31 PROCEDURE — 250N000011 HC RX IP 250 OP 636: Performed by: SURGERY

## 2022-03-31 PROCEDURE — 49650 LAP ING HERNIA REPAIR INIT: CPT | Mod: LT | Performed by: SURGERY

## 2022-03-31 PROCEDURE — S2900 ROBOTIC SURGICAL SYSTEM: HCPCS | Performed by: SURGERY

## 2022-03-31 PROCEDURE — 250N000009 HC RX 250: Performed by: SURGERY

## 2022-03-31 PROCEDURE — 272N000001 HC OR GENERAL SUPPLY STERILE: Performed by: SURGERY

## 2022-03-31 PROCEDURE — 250N000013 HC RX MED GY IP 250 OP 250 PS 637: Performed by: ANESTHESIOLOGY

## 2022-03-31 PROCEDURE — 250N000009 HC RX 250: Performed by: NURSE ANESTHETIST, CERTIFIED REGISTERED

## 2022-03-31 PROCEDURE — 250N000011 HC RX IP 250 OP 636: Performed by: ANESTHESIOLOGY

## 2022-03-31 PROCEDURE — 258N000003 HC RX IP 258 OP 636

## 2022-03-31 PROCEDURE — P9041 ALBUMIN (HUMAN),5%, 50ML: HCPCS | Performed by: NURSE ANESTHETIST, CERTIFIED REGISTERED

## 2022-03-31 PROCEDURE — 370N000017 HC ANESTHESIA TECHNICAL FEE, PER MIN: Performed by: SURGERY

## 2022-03-31 PROCEDURE — 258N000003 HC RX IP 258 OP 636: Performed by: ANESTHESIOLOGY

## 2022-03-31 PROCEDURE — 360N000080 HC SURGERY LEVEL 7, PER MIN: Performed by: SURGERY

## 2022-03-31 PROCEDURE — 250N000025 HC SEVOFLURANE, PER MIN: Performed by: SURGERY

## 2022-03-31 PROCEDURE — 250N000011 HC RX IP 250 OP 636

## 2022-03-31 DEVICE — LAPAROSCOPIC SELF-FIXATING MESH POLYESTER WITH POLYLACTIC ACID GRIPS AND COLLAGEN FILM
Type: IMPLANTABLE DEVICE | Site: ABDOMEN | Status: FUNCTIONAL
Brand: PROGRIP

## 2022-03-31 RX ORDER — CEFAZOLIN SODIUM/WATER 2 G/20 ML
2 SYRINGE (ML) INTRAVENOUS SEE ADMIN INSTRUCTIONS
Status: DISCONTINUED | OUTPATIENT
Start: 2022-03-31 | End: 2022-03-31 | Stop reason: HOSPADM

## 2022-03-31 RX ORDER — SODIUM CHLORIDE, SODIUM LACTATE, POTASSIUM CHLORIDE, CALCIUM CHLORIDE 600; 310; 30; 20 MG/100ML; MG/100ML; MG/100ML; MG/100ML
INJECTION, SOLUTION INTRAVENOUS CONTINUOUS
Status: DISCONTINUED | OUTPATIENT
Start: 2022-03-31 | End: 2022-03-31 | Stop reason: HOSPADM

## 2022-03-31 RX ORDER — FENTANYL CITRATE 50 UG/ML
25 INJECTION, SOLUTION INTRAMUSCULAR; INTRAVENOUS EVERY 5 MIN PRN
Status: DISCONTINUED | OUTPATIENT
Start: 2022-03-31 | End: 2022-03-31 | Stop reason: HOSPADM

## 2022-03-31 RX ORDER — NALOXONE HYDROCHLORIDE 1 MG/ML
0.4 INJECTION INTRAMUSCULAR; INTRAVENOUS; SUBCUTANEOUS
Status: DISCONTINUED | OUTPATIENT
Start: 2022-03-31 | End: 2022-03-31 | Stop reason: HOSPADM

## 2022-03-31 RX ORDER — CEFAZOLIN SODIUM/WATER 2 G/20 ML
2 SYRINGE (ML) INTRAVENOUS
Status: COMPLETED | OUTPATIENT
Start: 2022-03-31 | End: 2022-03-31

## 2022-03-31 RX ORDER — KETAMINE HYDROCHLORIDE 50 MG/ML
INJECTION, SOLUTION INTRAMUSCULAR; INTRAVENOUS PRN
Status: DISCONTINUED | OUTPATIENT
Start: 2022-03-31 | End: 2022-03-31

## 2022-03-31 RX ORDER — GLYCOPYRROLATE 0.2 MG/ML
INJECTION, SOLUTION INTRAMUSCULAR; INTRAVENOUS PRN
Status: DISCONTINUED | OUTPATIENT
Start: 2022-03-31 | End: 2022-03-31

## 2022-03-31 RX ORDER — LIDOCAINE HYDROCHLORIDE 20 MG/ML
INJECTION, SOLUTION INFILTRATION; PERINEURAL PRN
Status: DISCONTINUED | OUTPATIENT
Start: 2022-03-31 | End: 2022-03-31

## 2022-03-31 RX ORDER — ONDANSETRON 2 MG/ML
4 INJECTION INTRAMUSCULAR; INTRAVENOUS EVERY 30 MIN PRN
Status: DISCONTINUED | OUTPATIENT
Start: 2022-03-31 | End: 2022-03-31 | Stop reason: HOSPADM

## 2022-03-31 RX ORDER — HYDROCODONE BITARTRATE AND ACETAMINOPHEN 5; 325 MG/1; MG/1
1 TABLET ORAL EVERY 6 HOURS PRN
Qty: 18 TABLET | Refills: 0 | Status: SHIPPED | OUTPATIENT
Start: 2022-03-31 | End: 2022-04-03

## 2022-03-31 RX ORDER — LIDOCAINE HYDROCHLORIDE AND EPINEPHRINE 10; 10 MG/ML; UG/ML
INJECTION, SOLUTION INFILTRATION; PERINEURAL PRN
Status: DISCONTINUED | OUTPATIENT
Start: 2022-03-31 | End: 2022-03-31 | Stop reason: HOSPADM

## 2022-03-31 RX ORDER — LIDOCAINE 40 MG/G
CREAM TOPICAL
Status: DISCONTINUED | OUTPATIENT
Start: 2022-03-31 | End: 2022-03-31 | Stop reason: HOSPADM

## 2022-03-31 RX ORDER — NALOXONE HYDROCHLORIDE 1 MG/ML
0.2 INJECTION INTRAMUSCULAR; INTRAVENOUS; SUBCUTANEOUS
Status: DISCONTINUED | OUTPATIENT
Start: 2022-03-31 | End: 2022-03-31 | Stop reason: HOSPADM

## 2022-03-31 RX ORDER — ONDANSETRON 4 MG/1
4 TABLET, ORALLY DISINTEGRATING ORAL EVERY 30 MIN PRN
Status: DISCONTINUED | OUTPATIENT
Start: 2022-03-31 | End: 2022-03-31 | Stop reason: HOSPADM

## 2022-03-31 RX ORDER — MEPERIDINE HYDROCHLORIDE 25 MG/ML
12.5 INJECTION INTRAMUSCULAR; INTRAVENOUS; SUBCUTANEOUS
Status: DISCONTINUED | OUTPATIENT
Start: 2022-03-31 | End: 2022-03-31 | Stop reason: HOSPADM

## 2022-03-31 RX ORDER — DOCUSATE SODIUM 100 MG/1
100 CAPSULE, LIQUID FILLED ORAL 2 TIMES DAILY
Qty: 30 CAPSULE | Refills: 0 | Status: SHIPPED | OUTPATIENT
Start: 2022-03-31 | End: 2022-10-27

## 2022-03-31 RX ORDER — ONDANSETRON 2 MG/ML
INJECTION INTRAMUSCULAR; INTRAVENOUS PRN
Status: DISCONTINUED | OUTPATIENT
Start: 2022-03-31 | End: 2022-03-31

## 2022-03-31 RX ORDER — ALBUMIN, HUMAN INJ 5% 5 %
SOLUTION INTRAVENOUS CONTINUOUS PRN
Status: DISCONTINUED | OUTPATIENT
Start: 2022-03-31 | End: 2022-03-31

## 2022-03-31 RX ORDER — FENTANYL CITRATE 50 UG/ML
INJECTION, SOLUTION INTRAMUSCULAR; INTRAVENOUS PRN
Status: DISCONTINUED | OUTPATIENT
Start: 2022-03-31 | End: 2022-03-31

## 2022-03-31 RX ORDER — DEXAMETHASONE SODIUM PHOSPHATE 10 MG/ML
INJECTION, SOLUTION INTRAMUSCULAR; INTRAVENOUS PRN
Status: DISCONTINUED | OUTPATIENT
Start: 2022-03-31 | End: 2022-03-31

## 2022-03-31 RX ORDER — FENTANYL CITRATE 50 UG/ML
25 INJECTION, SOLUTION INTRAMUSCULAR; INTRAVENOUS
Status: DISCONTINUED | OUTPATIENT
Start: 2022-03-31 | End: 2022-03-31 | Stop reason: HOSPADM

## 2022-03-31 RX ORDER — PROPOFOL 10 MG/ML
INJECTION, EMULSION INTRAVENOUS PRN
Status: DISCONTINUED | OUTPATIENT
Start: 2022-03-31 | End: 2022-03-31

## 2022-03-31 RX ORDER — OXYCODONE HYDROCHLORIDE 5 MG/1
5 TABLET ORAL EVERY 4 HOURS PRN
Status: DISCONTINUED | OUTPATIENT
Start: 2022-03-31 | End: 2022-03-31 | Stop reason: HOSPADM

## 2022-03-31 RX ADMIN — KETAMINE HYDROCHLORIDE 25 MG: 50 INJECTION, SOLUTION INTRAMUSCULAR; INTRAVENOUS at 16:18

## 2022-03-31 RX ADMIN — PHENYLEPHRINE HYDROCHLORIDE 100 MCG: 10 INJECTION INTRAVENOUS at 16:58

## 2022-03-31 RX ADMIN — HYDROMORPHONE HYDROCHLORIDE 0.6 MG: 1 INJECTION, SOLUTION INTRAMUSCULAR; INTRAVENOUS; SUBCUTANEOUS at 17:52

## 2022-03-31 RX ADMIN — SUGAMMADEX 200 MG: 100 INJECTION, SOLUTION INTRAVENOUS at 17:47

## 2022-03-31 RX ADMIN — MIDAZOLAM 2 MG: 1 INJECTION INTRAMUSCULAR; INTRAVENOUS at 15:48

## 2022-03-31 RX ADMIN — FENTANYL CITRATE 100 MCG: 50 INJECTION, SOLUTION INTRAMUSCULAR; INTRAVENOUS at 15:57

## 2022-03-31 RX ADMIN — ALBUMIN HUMAN: 0.05 INJECTION, SOLUTION INTRAVENOUS at 17:16

## 2022-03-31 RX ADMIN — PHENYLEPHRINE HYDROCHLORIDE 100 MCG: 10 INJECTION INTRAVENOUS at 16:23

## 2022-03-31 RX ADMIN — DEXAMETHASONE SODIUM PHOSPHATE 10 MG: 10 INJECTION, SOLUTION INTRAMUSCULAR; INTRAVENOUS at 15:57

## 2022-03-31 RX ADMIN — GLYCOPYRROLATE 0.2 MG: 0.2 INJECTION, SOLUTION INTRAMUSCULAR; INTRAVENOUS at 16:11

## 2022-03-31 RX ADMIN — FENTANYL CITRATE 25 MCG: 50 INJECTION INTRAMUSCULAR; INTRAVENOUS at 18:28

## 2022-03-31 RX ADMIN — ROCURONIUM BROMIDE 10 MG: 50 INJECTION, SOLUTION INTRAVENOUS at 16:27

## 2022-03-31 RX ADMIN — PROPOFOL 200 MG: 10 INJECTION, EMULSION INTRAVENOUS at 15:57

## 2022-03-31 RX ADMIN — ROCURONIUM BROMIDE 10 MG: 50 INJECTION, SOLUTION INTRAVENOUS at 17:11

## 2022-03-31 RX ADMIN — PHENYLEPHRINE HYDROCHLORIDE 100 MCG: 10 INJECTION INTRAVENOUS at 17:02

## 2022-03-31 RX ADMIN — GLYCOPYRROLATE 0.2 MG: 0.2 INJECTION, SOLUTION INTRAMUSCULAR; INTRAVENOUS at 15:57

## 2022-03-31 RX ADMIN — ROCURONIUM BROMIDE 40 MG: 50 INJECTION, SOLUTION INTRAVENOUS at 15:57

## 2022-03-31 RX ADMIN — Medication 2 G: at 15:45

## 2022-03-31 RX ADMIN — OXYCODONE HYDROCHLORIDE 5 MG: 5 TABLET ORAL at 18:48

## 2022-03-31 RX ADMIN — PHENYLEPHRINE HYDROCHLORIDE 100 MCG: 10 INJECTION INTRAVENOUS at 16:40

## 2022-03-31 RX ADMIN — PHENYLEPHRINE HYDROCHLORIDE 100 MCG: 10 INJECTION INTRAVENOUS at 16:51

## 2022-03-31 RX ADMIN — ONDANSETRON 2 MG: 2 INJECTION INTRAMUSCULAR; INTRAVENOUS at 16:10

## 2022-03-31 RX ADMIN — KETAMINE HYDROCHLORIDE 25 MG: 50 INJECTION, SOLUTION INTRAMUSCULAR; INTRAVENOUS at 15:57

## 2022-03-31 RX ADMIN — SODIUM CHLORIDE, POTASSIUM CHLORIDE, SODIUM LACTATE AND CALCIUM CHLORIDE: 600; 310; 30; 20 INJECTION, SOLUTION INTRAVENOUS at 14:23

## 2022-03-31 RX ADMIN — FENTANYL CITRATE 25 MCG: 50 INJECTION INTRAMUSCULAR; INTRAVENOUS at 18:21

## 2022-03-31 RX ADMIN — ONDANSETRON 2 MG: 2 INJECTION INTRAMUSCULAR; INTRAVENOUS at 15:57

## 2022-03-31 RX ADMIN — SODIUM CHLORIDE, POTASSIUM CHLORIDE, SODIUM LACTATE AND CALCIUM CHLORIDE: 600; 310; 30; 20 INJECTION, SOLUTION INTRAVENOUS at 17:02

## 2022-03-31 RX ADMIN — LIDOCAINE HYDROCHLORIDE 60 MG: 20 INJECTION, SOLUTION INFILTRATION; PERINEURAL at 15:57

## 2022-03-31 RX ADMIN — PHENYLEPHRINE HYDROCHLORIDE 100 MCG: 10 INJECTION INTRAVENOUS at 16:34

## 2022-03-31 NOTE — ANESTHESIA CARE TRANSFER NOTE
Patient: Satish Lang    Procedure: Procedure(s):  HERNIORRHAPHY, INGUINAL, ROBOT-ASSISTED, LAPAROSCOPIC, USING DA SRINIVAS XI, LEFT       Diagnosis: Inguinal hernia, left [K40.90]  Diagnosis Additional Information: No value filed.    Anesthesia Type:   General     Note:    Oropharynx: oropharynx clear of all foreign objects and spontaneously breathing  Level of Consciousness: drowsy  Oxygen Supplementation: face mask  Level of Supplemental Oxygen (L/min / FiO2): 6  Independent Airway: airway patency satisfactory and stable  Dentition: dentition unchanged  Vital Signs Stable: post-procedure vital signs reviewed and stable  Report to RN Given: handoff report given  Patient transferred to: PACU    Handoff Report: Identifed the Patient, Identified the Reponsible Provider, Reviewed the pertinent medical history, Discussed the surgical course, Reviewed Intra-OP anesthesia mangement and issues during anesthesia, Set expectations for post-procedure period and Allowed opportunity for questions and acknowledgement of understanding      Vitals:  Vitals Value Taken Time   /75 03/31/22 1800   Temp 36.5  C (97.7  F) 03/31/22 1800   Pulse 80 03/31/22 1804   Resp 16 03/31/22 1804   SpO2 100 % 03/31/22 1804   Vitals shown include unvalidated device data.    Electronically Signed By: YULY Brewer CRNA  March 31, 2022  6:05 PM

## 2022-03-31 NOTE — ANESTHESIA POSTPROCEDURE EVALUATION
Patient: Satish Lang    Procedure: Procedure(s):  HERNIORRHAPHY, INGUINAL, ROBOT-ASSISTED, LAPAROSCOPIC, USING DA SRINIVAS XI, LEFT       Anesthesia Type:  General    Note:  Disposition: Outpatient   Postop Pain Control: Uneventful            Sign Out: Well controlled pain   PONV: No   Neuro/Psych: Uneventful            Sign Out: Acceptable/Baseline neuro status   Airway/Respiratory: Uneventful            Sign Out: Acceptable/Baseline resp. status   CV/Hemodynamics: Uneventful            Sign Out: Acceptable CV status; No obvious hypovolemia; No obvious fluid overload   Other NRE: NONE   DID A NON-ROUTINE EVENT OCCUR? No           Last vitals:  Vitals Value Taken Time   /73 03/31/22 1815   Temp 36.5  C (97.7  F) 03/31/22 1800   Pulse 77 03/31/22 1819   Resp 15 03/31/22 1819   SpO2 100 % 03/31/22 1819   Vitals shown include unvalidated device data.    Electronically Signed By: Greta Kendrick MD  March 31, 2022  6:20 PM

## 2022-03-31 NOTE — ANESTHESIA PROCEDURE NOTES
Airway       Patient location during procedure: OR       Procedure Start/Stop Times: 3/31/2022 4:00 PM  Staff -        Anesthesiologist:  Butch Sommer MD       CRNA: Cristian Berg APRN CRNA       Performed By: CRNA  Consent for Airway        Urgency: elective  Indications and Patient Condition       Indications for airway management: tash-procedural       Induction type:intravenous       Mask difficulty assessment: 1 - vent by mask    Final Airway Details       Final airway type: endotracheal airway       Successful airway: ETT - single  Endotracheal Airway Details        ETT size (mm): 7.5       Cuffed: yes       Cuff volume (mL): 8       Successful intubation technique: direct laryngoscopy       DL Blade Type: Beatty 2       Grade View of Cords: 2       Adjucts: stylet and tooth guard       Position: Right       Measured from: lips       Secured at (cm): 23       Bite block used: None    Post intubation assessment        Placement verified by: capnometry, equal breath sounds and chest rise        Number of attempts at approach: 1       Number of other approaches attempted: 0       Secured with: silk tape       Ease of procedure: easy       Dentition: Intact and Unchanged

## 2022-03-31 NOTE — ANESTHESIA PREPROCEDURE EVALUATION
Anesthesia Pre-Procedure Evaluation    Patient: Satish Lang   MRN: [id number] : 1950        Procedure : Procedure(s):  HERNIORRHAPHY, INGUINAL, ROBOT-ASSISTED, LAPAROSCOPIC, USING DA SRINIVAS XI          Past Medical History:   Diagnosis Date     Achilles tendon rupture 2019     Aortic ectasia (H)      Cataracts, bilateral      Hypercholesterolemia      Hypertension      Oral herpes      Prostate cancer (H)      Thoracic aortic ectasia (H)     [facility]     Tinnitus       Past Surgical History:   Procedure Laterality Date     OTHER SURGICAL HISTORY      right inguinal hernia repair     PROSTATECTOMY  2012     REPAIR TENDON ACHILLES        No Known Allergies   Social History     Tobacco Use     Smoking status: Never Smoker     Smokeless tobacco: Never Used   Substance Use Topics     Alcohol use: Yes     Comment: Alcoholic Drinks/day: wine 2x/week      Wt Readings from Last 1 Encounters:   22 85.1 kg (187 lb 11.2 oz)        Anesthesia Evaluation   Pt has had prior anesthetic. Type: General and MAC.        ROS/MED HX  ENT/Pulmonary:  - neg pulmonary ROS     Neurologic:  - neg neurologic ROS     Cardiovascular:     (+) hypertension--CAD ---    METS/Exercise Tolerance:     Hematologic:  - neg hematologic  ROS     Musculoskeletal:  - neg musculoskeletal ROS     GI/Hepatic:  - neg GI/hepatic ROS     Renal/Genitourinary:  - neg Renal ROS     Endo:  - neg endo ROS     Psychiatric/Substance Use:  - neg psychiatric ROS     Infectious Disease:  - neg infectious disease ROS     Malignancy:   (+) Malignancy, History of Prostate.Prostate CA Remission status post Surgery.        Other:            Physical Exam    Airway  airway exam normal      Mallampati: II   TM distance: > 3 FB   Neck ROM: full     Respiratory Devices and Support         Dental  no notable dental history         Cardiovascular   cardiovascular exam normal       Rhythm and rate: regular and normal     Pulmonary   pulmonary exam  normal        breath sounds clear to auscultation           OUTSIDE LABS:  CBC: No results found for: WBC, HGB, HCT, PLT  BMP:   Lab Results   Component Value Date     08/02/2021     08/13/2020    POTASSIUM 3.4 (L) 08/02/2021    POTASSIUM 3.9 08/13/2020    CHLORIDE 106 08/02/2021    CHLORIDE 106 08/13/2020    CO2 29 08/02/2021    CO2 27 08/13/2020    BUN 15 08/02/2021    BUN 15 08/13/2020    CR 0.86 08/02/2021    CR 1.1 01/11/2021    GLC 95 08/02/2021     08/13/2020     COAGS: No results found for: PTT, INR, FIBR  POC: No results found for: BGM, HCG, HCGS  HEPATIC:   Lab Results   Component Value Date    ALBUMIN 3.8 08/02/2021    PROTTOTAL 6.3 08/02/2021    ALT 28 08/02/2021    AST 29 08/02/2021    ALKPHOS 80 08/02/2021    BILITOTAL 0.6 08/02/2021     OTHER:   Lab Results   Component Value Date    BONI 9.3 08/02/2021       Anesthesia Plan    ASA Status:  3   NPO Status:  NPO Appropriate    Anesthesia Type: General.     - Airway: ETT      Maintenance: Inhalation.        Consents    Anesthesia Plan(s) and associated risks, benefits, and realistic alternatives discussed. Questions answered and patient/representative(s) expressed understanding.    - Discussed:     - Discussed with:  Patient, Spouse      - Extended Intubation/Ventilatory Support Discussed: No.         Postoperative Care    Pain management: IV analgesics.   PONV prophylaxis: Ondansetron (or other 5HT-3), Dexamethasone or Solumedrol     Comments:                Butch Sommer MD

## 2022-03-31 NOTE — OP NOTE
"[facility]    Operative Note    Pre-operative diagnosis: Inguinal hernia, left [K40.90]  Post-operative diagnosis Same as pre-operative diagnosis    Procedure: Procedure(s):  HERNIORRHAPHY, INGUINAL, ROBOT-ASSISTED, LAPAROSCOPIC, USING DA SRINIVAS XI, LEFT  Surgeon: Surgeon(s) and Role:     * Donis Alvarado, DO - Primary  Anesthesia: General   Estimated Blood Loss: 5 mL    Drains: None  Specimens: * No specimens in log *  Findings:     - Moderate-sized direct left-sided inguinal hernia  -Significant adhesions of the sigmoid colon onto the left anterior abdominal wall and pelvic region    Complications: None.  Implants:   Implant Name Type Inv. Item Serial No.  Lot No. LRB No. Used Action   MESH PROGRIP LAPAROSCOPIC 5.9X3.9\" [employer]EX SELF-FIX [id number] - [id number] Mesh MESH PROGRIP LAPAROSCOPIC 5.9X3.9\" PARI[employer]EX SELF-FIX [id number]  COVIDIEN [id number] Left 1 Implanted     Procedure: Patient was brought to the operating room placed on operating table in the supine position.  Patient's bilateral upper extremities were padded and tucked in the usual fashion.  Patient underwent sedation and intubation by the anesthesia team.  Patient's abdomen was prepped and draped in usual sterile fashion.  Prior to initiating procedure, a timeout was completed.  All present were in agreement.      1% lidocaine with epinephrine was localized over Bhatti's point.  An 11 blade was used to make a pinpoint skin incision to allow the Veress needle for abdominal access.  Once the Veress needle was intra-abdominal insufflation was initiated.  Once sufficient insufflation was obtained, x3 8 mm ports were placed at the same level supraumbilical, right upper quadrant, left upper quadrant.  Ports were placed under direct visualization.  The robot was then docked.    On general survey, the patient had a left inguinal hernia.  The inguinal hernia was a direct hernia.  I could also see that there were no injuries to the " abdominal contents upon entry into the abdomen.  Since the patient was complaining of some right-sided inguinal pain, I did evaluate the right-sided inguinal region.  I did not identify any recurrence of the right inguinal hernia.  I did see that the patient had significant adhesions of the sigmoid colon to the inferior portion of the left anterior abdominal wall as well as in the pelvic region.     I started off the procedure by lysing the adhesions of the sigmoid colon off of the peritoneum.  This will aid me later when I suture the peritoneal flap closed so that the weight of the sigmoid colon would not prevent closure of the peritoneal flap.  The adhesions were then lysed using a combination of electrocautery as well as sharp dissection using the monopolar scissors.  After taking down the sigmoid colon, I evaluated to ensure that there is no injuries to the sigmoid colon.    The peritoneal flap was then created using a combination of electrocautery as well as sharp dissection with the scissors.  This flap was taken down below Richardson's ligament.  The flap was extended all the way to the lateral side towards the left ASIS and medially towards the pubic symphysis.  Once the flap was created, the hernia sac was identified and reduced using a combination of gentle traction and also sharp dissection with the scissors as well as electrocautery.  Once the hernia sac was reduced, the the fatty preperitoneal contents were also identified and reduced.  The cord structures were all identified.  Care was taken to ensure that there was no injury to the cord structures.      A 10 x 15 cm ProGrip mesh was then inserted and placed in the left inguinal region to cover the left inguinal hernia.  Once the mesh was in proper position, I took a 3-0 Vicryl stitch and I anchored the preperitoneal contents onto the peritoneal flap to ensure that it would not reherniate underneath the mesh.  A 2-0 V lock stitch was then used to close the  peritoneal flap.  The trochars were then removed under direct visualization.  There is no identifiable bleeding from any of the trocar sites.  The trocar sites were then closed using a 4-0 vicryl stitch in a subcuticular fashion.  The surgical incisions were reinforced using surgical glue.      At the end of the procedure a final count was completed.  I was told that all sharps, sponges, instruments were accounted for.  The patient's testicles were then retracted back into the scrotum. The patient had the sedation reversed and was extubated and brought back to the PACU in stable condition.                              Donis Alvarado DO  General Surgeon  [facility]  Surgery [address] [address]?  Office: [phone]  Employed by - [facility]  Pager: [phone]

## 2022-03-31 NOTE — H&P
General Surgery H&P  Satish Lang MRN# [id number]   Age/Sex: 72 year old male YOB: 1950     Reason for visit:  Left-sided inguinal hernia       Referring physician:  Enrique Figueroa MD                   Assessment and Plan:   Assessment:  1.  Left-sided inguinal hernia -reducible left-sided inguinal hernia.  Causing the patient discomfort.  2.  Right-sided inguinal hernia status post repair 1980s  - patient states that the right-sided inguinal region started to cause some mild discomfort in the last couple of days.    Plan:  -To the OR today for a robotic assisted laparoscopic left-sided inguinal hernia repair with mesh placement possible open.  I explained to the patient that given the new symptoms of the right-sided inguinal comfort, I will evaluate that while I am sure abdominal.  However I will not be repairing it.  We can have a discussion about repair if I do find any abnormalities intraoperatively.    -I discussed in detail with the patient regarding mesh placement.  I explained the different options of repairing the hernia.  If the hernia was repaired primarily without mesh, there is a higher chance of recurrence.  We discussed in detail using synthetic versus biologic mesh.  I explained to the different scenarios in which each mesh would be more appropriate.  Patient was allowed time to ask questions and concerns regarding mesh placement.  The patient is accepting of the use of mesh with the hernia repair.      -The risks and benefits of the procedure were explained detail to the patient. The risks include infection, bleeding, damage to the surrounding structures possible damage to the particular structures. Patient verbalized understanding provided consent to undergo the procedure above.            Chief Complaint:   Here for left-sided inguinal hernia surgery     History is obtained from the patient    HPI:   Satish Lang is a 72 year old male who presents to the [facility]  today for a robotic assisted laparoscopic repair of the left-sided inguinal hernia with mesh placement.  The patient was seen in surgery clinic back in February for a left-sided inguinal hernia.  Since last time that we have met, the patient states that he has not had any significant changes to the left-sided inguinal hernia.  Patient did notice that a couple days ago he had some mild discomfort over the right sided inguinal hernia which was repaired back in the 1980s.  Patient does not notice any swelling over the site.  Denies any clinical symptoms of obstruction.  Has no further complaints at this time.          Past Medical History:     Past Medical History:   Diagnosis Date     Achilles tendon rupture 01/01/2019     Aortic ectasia (H)      Cataracts, bilateral      Hypercholesterolemia      Hypertension      Oral herpes      Prostate cancer (H)      Thoracic aortic ectasia (H)     sees [facility]     Tinnitus               Past Surgical History:     Past Surgical History:   Procedure Laterality Date     OTHER SURGICAL HISTORY  1984    right inguinal hernia repair     PROSTATECTOMY  2012     REPAIR TENDON ACHILLES               Social History:    reports that he has never smoked. He has never used smokeless tobacco. He reports current alcohol use. He reports that he does not use drugs.           Family History:     Family History   Problem Relation Age of Onset     Hyperlipidemia Mother      Heart Disease Mother      Cancer Mother      Heart Disease Father      Coronary Artery Disease Brother         PTCA with stenting     Coronary Artery Disease Brother         PTCA with stenting     Coronary Artery Disease Sister         PTCA with stenting              Allergies:   No Known Allergies           Medications:     Prior to Admission medications    Medication Sig Start Date End Date Taking? Authorizing Provider   amlodipine-olmesartan (TAO) 10-40 mg per tablet [AMLODIPINE-OLMESARTAN (TAO) 10-40 MG PER TABLET]  Take 1 tablet by mouth daily. 9/7/20  Yes Provider, Historical   atorvastatin (LIPITOR) 40 MG tablet [ATORVASTATIN (LIPITOR) 40 MG TABLET] 40 mg bedtime.  8/25/14  Yes Provider, Historical   melatonin 5 MG tablet Take 5 mg by mouth nightly as needed for sleep   Yes Reported, Patient   meloxicam (MOBIC) 7.5 MG tablet [MELOXICAM (MOBIC) 7.5 MG TABLET] Take 1 tablet by mouth daily. 7/28/20  Yes Provider, Historical   valACYclovir (VALTREX) 1000 mg tablet Take 2,000 mg by mouth 2 times daily   Yes Reported, Patient              Review of Systems:   A 12 point Review of Systems is negative other than noted in the HPI            Physical Exam:     Patient Vitals for the past 24 hrs:   BP Temp Temp src Pulse Resp SpO2 Weight   03/31/22 1314 122/78 97.8  F (36.6  C) Oral (!) 49 18 99 % 85.1 kg (187 lb 11.2 oz)        No intake or output data in the 24 hours ending 03/31/22 1428   Constitutional:   awake, alert, cooperative, no apparent distress, and appears stated age       Eyes:   PERRL, conjunctiva/corneas clear, EOM's intact; no scleral edema or icterus noted        ENT:   Normocephalic, without obvious abnormality, atraumatic, Lips, mucosa, and tongue normal        Hematologic / Lymphatic:   No lymphadenopathy       Lungs:   Normal respiratory effort, no accessory muscle use, breath sounds bilaterally on auscultation       Cardiovascular:   Regular rate and rhythm       Abdomen:   Soft, nondistended, nontender to palpation    -Left-sided inguinal hernia that is reducible.  Mild tenderness to palpation.       Musculoskeletal:   No obvious swelling, bruising or deformity       Skin:   Skin color and texture normal for patient, no rashes or lesions              Data:          DO Donis Gilbert, DO  General Surgeon  [facility]  Surgery [facility] - [address]  [address]  [place] [address]?  Office: [phone]  Employed by - [employer]  Pager:  [phone]

## 2022-03-31 NOTE — DISCHARGE INSTRUCTIONS
Follow up: It is our practice to have all patients follow up with us 2-3 weeks after their surgery to ensure they are recovering well.  For straightforwardlaparoscopic procedures, this can be done either in clinic as a scheduled follow up appointment, or over the phone.  If you would like a scheduled follow up appointment in clinic, please call us at [phone] to schedule an appointment at your convenience.  If you would prefer to follow up with us by phone please let us know so that we may contact you 2-3 weeks following your procedure.         Diet: Regular diet. Patients can have difficulty with constipation following surgery, due in part to the administration of narcotic medications.  If you are suffering with constipation, you should avoid foods such as hard cheeses or red meat.  Foods high infiber are recommended.       Activity: You should continue to be active at home, including ambulating frequently.  If possible try to limit the amount of time spent in bed.     Restrictions: You have no liftingrestrictions post operatively, but may wish to avoid strenuous physical activity for 1-2 weeks.  You should limit your physical activity if it causes you discomfort; however, this should resolve within 1-2 weeks.   Walking does not count as strenuous physical activity.  You are safe to walk up and down stairs.  Following 2 weeks you may resume all normal physical activity.     Wound / drain care: Your incisions are closed using absorbale sutures.  The skin is sealed with a surgical glue.  Do not peal the glue off.  Please allow the glue to peal off on its own.      It is normal to have a smallrim of red present around the incisions. This should not, however, extend beyond 1/4 inch from the incision.  If your incisions become increasingly tender, red, or draining, please contact us.        Bathing: Youmay shower after 24 hours from surgery.  It is ok to get your incisions wet, but avoid rubbing them.  Avoid  soaking in bath tubs, or swimming in lakes, pools, or streams for 4 weeks following surgery.          Having Laparoscopic Groin Hernia Repair: USMAN   A groin hernia is a bulge at a weak spot in the lower belly (abdominal) wall. Groin hernias are also called inguinal or femoral hernias. Tissue or organs may press into the weak spot. This may cause symptoms of discomfort or pain. If left untreated, a hernia can get worse and may lead to serious problems. Surgery can be done to repair a hernia.   What is laparoscopy?  Your groin hernia operation will be done with a technique called laparoscopy. For this, a thin lighted tube called a laparoscope is used. The scope allows the surgeon to work through a few small incisions. This is instead of the one larger incision that is made for open surgery. Recovery from laparoscopy is often faster and less painful than from open surgery.   What is USMAN?  USMAN is one way to do a groin hernia repair. It stands for transabdominal preperitoneal. The peritoneum is a membrane that covers the organs in the abdomen. During USMAN, the peritoneum is opened to reach the hernia.     Preparing for your surgery    Schedule tests as you have been told. These make sure your heart and lungs are healthy for surgery.    Tell your healthcare provider about all medicine you take. This includes aspirin, NSAIDs such as ibuprofen, blood-thinning medicines such as warfarin, herbs and other supplements. You may need to stop taking some or all of them before surgery.    Ask your healthcare provider for help to quit smoking. This will help stop the hernia from being strained by smoker s cough. It will also promote good blood flow for healing.    Don't life anything heavy. It can strain your hernia and make it worse.    Follow any directions you are given for taking medicines and for not eating or drinking before surgery.    Plan to have an adult family member or friend drive you home from the surgery. Arrange  for help with chores and errands while you recover.    During the procedure  The surgery takes 1 to 2 hours. You can likely go home the same day. Before the surgery starts, an IV line is put into a vein in your arm or hand. This line supplies fluids and medicines.     To keep you free of pain during the surgery, you ll be given anesthesia. This may be general anesthesia. This medicine puts you in a state like deep sleep through the procedure. Or, you may be given regional anesthesia. This numbs the abdomen and makes you relaxed and drowsy through the surgery.    The surgeon makes 3 to 4 small incisions in the abdomen. The scope is put through one of the incisions. The scope sends live pictures to a video screen. This lets the surgeon to see inside the abdomen. Surgical tools are placed through the other small incisions.    Your abdomen is inflated with carbon dioxide. This gas provides space for the surgeon to see and work to repair the hernia.    An incision is made into the peritoneum and a flap is created. Then the hernia is repaired.    After the repair, a patch of strong mesh is put over the weak spot in your abdominal wall. The patch acts like a patch on a tire. It stays in place permanently. Once the mesh is in place, the peritoneal flap is brought up to cover the mesh.    The gas is released from your abdomen. Your incisions are then closed with sutures.    A few people can't have laparoscopic surgery due to certain factors. In this case, the surgeon will have to use an open procedure. These factors can include obesity, past history of abdominal surgery, bleeding, and the inability to see the organs. The surgeon will make this decision either before or during the procedure.  Risks and possible complications of groin hernia repair     Bleeding    Infection    Numbness or pain in the groin or leg    Urinary retention (inability to urinate)    Bowel or bladder injury    Hernia comes back or new hernia  forms    Injury to  testicles    Problems from mesh    Risks of anesthesia     [place] last reviewed this educational content on 6/1/2019 2000-2021 [employer]. All rights reserved. This information is not intended as a substitute for professional medical care. Always follow your healthcare professional's instructions.

## 2022-04-11 ENCOUNTER — TELEPHONE (OUTPATIENT)
Dept: SURGERY | Facility: CLINIC | Age: 72
End: 2022-04-11
Payer: COMMERCIAL

## 2022-04-11 NOTE — TELEPHONE ENCOUNTER
Called pt and left detailed message informing him I would change his appt to a phone visit. Provided my direct number if he changes his mind and would like to be seen in person.

## 2022-04-11 NOTE — TELEPHONE ENCOUNTER
Pt called with an update from his hernia surgery on 3/31/21. He is doing exceptionally well and has no complaints. He did notice a small bulge at his left groin over the weekend. We discussed normal post-op swelling and what to expect. He has had no pain since the surgery at all. Has only had to take Tylenol for pain. He is scheudled for a post-op on 4/19/22 with Dr. Alvarado. He is wondering if ne needs to be seen in person if doing well, or if he can change to a phone visit. I will check with Dr. Alvarado and call the pt back.

## 2022-04-19 ENCOUNTER — VIRTUAL VISIT (OUTPATIENT)
Dept: SURGERY | Facility: CLINIC | Age: 72
End: 2022-04-19
Payer: COMMERCIAL

## 2022-04-19 DIAGNOSIS — Z48.89 POSTOPERATIVE VISIT: Primary | ICD-10-CM

## 2022-04-19 PROCEDURE — 99024 POSTOP FOLLOW-UP VISIT: CPT | Performed by: SURGERY

## 2022-04-19 RX ORDER — ATORVASTATIN CALCIUM 40 MG/1
1 TABLET, FILM COATED ORAL DAILY
COMMUNITY

## 2022-04-19 RX ORDER — MELOXICAM 7.5 MG/1
TABLET ORAL
COMMUNITY

## 2022-04-19 NOTE — PROGRESS NOTES
"  The patient has been notified of following:     \"This telephone visit will be conducted via a call between you and your physician/provider. We have found that certain health care needs can be provided without the need for a physical exam.  This service lets us provide the care you need with a short phone conversation.  If a prescription is necessary we can send it directly to your pharmacy.  If lab work is needed we can place an order for that and you can then stop by our lab to have the test done at a later time.    Telephone visits are billed at different rates depending on your insurance coverage. During this emergency period, for some insurers they may be billed the same as an in-person visit.  Please reach out to your insurance provider with any questions.    If during the course of the call the physician/provider feels a telephone visit is not appropriate, you will not be charged for this service.\"    Patient has given verbal consent to a Telephone visit? Yes    What phone number would you like to be contacted at? [phone]    Patient would like to receive their AVS by [facility]    Are there any specific questions or needs that you would like addressed at your visit today? Nothing specific, doing good after surg    Additional provider notes:     Called the patient for follow-up postoperatively.  The patient had a left-sided robotic assisted laparoscopic inguinal hernia repair on 3/31/2022.  Patient states that he is recovering well.  He has no abdominal pain.  No nausea no vomiting.  He is tolerating diet and passing flatus and having regular bowel movements with the stool softeners.  He is off the pain control.  Patient has swelling over the left inguinal region.  He has no pain over the site.  Patient is performing his daily activities without any difficulty.  Patient has no further complaints at this time.    Per patient the incisions are healing well well approximated.    Plan  -No further surgical " follow-up from my standpoint.  Patient can follow-up with me as needed in the future.  -Patient can continue to lift as tolerated.  -Activity as tolerated.    Phone call duration: 10 minutes

## 2022-04-19 NOTE — LETTER
"    4/19/2022         RE: Satish Lang  [address] [address]        Dear Colleague,    Thank you for referring your patient, Satish Lang, to the [facility] [place]. Please see a copy of my visit note below.      The patient has been notified of following:     \"This telephone visit will be conducted via a call between you and your physician/provider. We have found that certain health care needs can be provided without the need for a physical exam.  This service lets us provide the care you need with a short phone conversation.  If a prescription is necessary we can send it directly to your pharmacy.  If lab work is needed we can place an order for that and you can then stop by our lab to have the test done at a later time.    Telephone visits are billed at different rates depending on your insurance coverage. During this emergency period, for some insurers they may be billed the same as an in-person visit.  Please reach out to your insurance provider with any questions.    If during the course of the call the physician/provider feels a telephone visit is not appropriate, you will not be charged for this service.\"    Patient has given verbal consent to a Telephone visit? Yes    What phone number would you like to be contacted at? [phone]    Patient would like to receive their AVS by [employer]    Are there any specific questions or needs that you would like addressed at your visit today? Nothing specific, doing good after surg    Additional provider notes:     Called the patient for follow-up postoperatively.  The patient had a left-sided robotic assisted laparoscopic inguinal hernia repair on 3/31/2022.  Patient states that he is recovering well.  He has no abdominal pain.  No nausea no vomiting.  He is tolerating diet and passing flatus and having regular bowel movements with the stool softeners.  He is off the pain control.  Patient has swelling " over the left inguinal region.  He has no pain over the site.  Patient is performing his daily activities without any difficulty.  Patient has no further complaints at this time.    Per patient the incisions are healing well well approximated.    Plan  -No further surgical follow-up from my standpoint.  Patient can follow-up with me as needed in the future.  -Patient can continue to lift as tolerated.  -Activity as tolerated.    Phone call duration: 10 minutes          Again, thank you for allowing me to participate in the care of your patient.        Sincerely,        Donis Alvarado, DO

## 2022-06-07 ENCOUNTER — TRANSFERRED RECORDS (OUTPATIENT)
Dept: HEALTH INFORMATION MANAGEMENT | Facility: CLINIC | Age: 72
End: 2022-06-07

## 2022-06-07 ENCOUNTER — LAB REQUISITION (OUTPATIENT)
Dept: LAB | Facility: CLINIC | Age: 72
End: 2022-06-07

## 2022-06-07 DIAGNOSIS — E78.5 HYPERLIPIDEMIA, UNSPECIFIED: ICD-10-CM

## 2022-06-07 DIAGNOSIS — Z85.46 PERSONAL HISTORY OF MALIGNANT NEOPLASM OF PROSTATE: ICD-10-CM

## 2022-06-07 LAB
ALBUMIN SERPL-MCNC: 3.7 G/DL (ref 3.5–5)
ALP SERPL-CCNC: 79 U/L (ref 45–120)
ALT SERPL W P-5'-P-CCNC: 27 U/L (ref 0–45)
ANION GAP SERPL CALCULATED.3IONS-SCNC: 9 MMOL/L (ref 5–18)
AST SERPL W P-5'-P-CCNC: 23 U/L (ref 0–40)
BILIRUB SERPL-MCNC: 1 MG/DL (ref 0–1)
BUN SERPL-MCNC: 18 MG/DL (ref 8–28)
CALCIUM SERPL-MCNC: 9.5 MG/DL (ref 8.5–10.5)
CHLORIDE BLD-SCNC: 108 MMOL/L (ref 98–107)
CHOLEST SERPL-MCNC: 154 MG/DL
CO2 SERPL-SCNC: 27 MMOL/L (ref 22–31)
CREAT SERPL-MCNC: 0.86 MG/DL (ref 0.7–1.3)
GFR SERPL CREATININE-BSD FRML MDRD: >90 ML/MIN/1.73M2
GLUCOSE BLD-MCNC: 83 MG/DL (ref 70–125)
HDLC SERPL-MCNC: 64 MG/DL
LDLC SERPL CALC-MCNC: 72 MG/DL
POTASSIUM BLD-SCNC: 3.8 MMOL/L (ref 3.5–5)
PROT SERPL-MCNC: 6.5 G/DL (ref 6–8)
PSA SERPL-MCNC: <0.1 UG/L (ref 0–6.5)
SODIUM SERPL-SCNC: 144 MMOL/L (ref 136–145)
TRIGL SERPL-MCNC: 90 MG/DL

## 2022-06-07 PROCEDURE — 80061 LIPID PANEL: CPT | Performed by: NURSE PRACTITIONER

## 2022-06-07 PROCEDURE — 84153 ASSAY OF PSA TOTAL: CPT | Performed by: NURSE PRACTITIONER

## 2022-06-07 PROCEDURE — 80053 COMPREHEN METABOLIC PANEL: CPT | Performed by: NURSE PRACTITIONER

## 2022-07-17 ENCOUNTER — HEALTH MAINTENANCE LETTER (OUTPATIENT)
Age: 72
End: 2022-07-17

## 2022-09-25 ENCOUNTER — HEALTH MAINTENANCE LETTER (OUTPATIENT)
Age: 72
End: 2022-09-25

## 2022-10-24 ENCOUNTER — TELEPHONE (OUTPATIENT)
Dept: OTHER | Facility: CLINIC | Age: 72
End: 2022-10-24

## 2022-10-24 NOTE — TELEPHONE ENCOUNTER
Appt Note:  Establish care with Vascular Medicine.  Former patient of Dr. Zelaya, LOV 10/1/2020 for right leg swelling, lymphedema.    Pt needs to be scheduled for new patient consult with ANY Vascular medicine provider.  Will route to scheduling to coordinate an appointment at next available.    Nayana Diaz, JULITAN, RN  [facility]

## 2022-10-24 NOTE — TELEPHONE ENCOUNTER
[facility]    Who is the name of the provider?:  New    What is the location you see this provider at/preferred location?: [place]  Person calling: Self  Phone number:  [phone]   Nurse call back needed:  YES     Reason for call:   [place] vascular provider has retired, needs to schedule follow up appointment for vascular issues.      Pharmacy location:  NA  Outside Imaging: Not Applicable   Can we leave a detailed message on this number?  YES

## 2022-10-24 NOTE — TELEPHONE ENCOUNTER
Left voicemail with instructions for patient to call back to schedule their appointment(s)    October 24, 2022 , 2:32 PM

## 2022-10-25 NOTE — TELEPHONE ENCOUNTER
Future Appointments   Date Time Provider Department Center   10/27/2022 10:50 AM Esperanza Altman PA-C [facility]

## 2022-10-27 ENCOUNTER — OFFICE VISIT (OUTPATIENT)
Dept: OTHER | Facility: CLINIC | Age: 72
End: 2022-10-27
Attending: PHYSICIAN ASSISTANT
Payer: COMMERCIAL

## 2022-10-27 VITALS
WEIGHT: 191.4 LBS | HEART RATE: 61 BPM | BODY MASS INDEX: 27.4 KG/M2 | HEIGHT: 70 IN | SYSTOLIC BLOOD PRESSURE: 134 MMHG | OXYGEN SATURATION: 98 % | DIASTOLIC BLOOD PRESSURE: 79 MMHG

## 2022-10-27 DIAGNOSIS — I89.0 ACQUIRED LYMPHEDEMA OF LEG: Primary | ICD-10-CM

## 2022-10-27 PROCEDURE — G0463 HOSPITAL OUTPT CLINIC VISIT: HCPCS

## 2022-10-27 PROCEDURE — 99204 OFFICE O/P NEW MOD 45 MIN: CPT | Performed by: PHYSICIAN ASSISTANT

## 2022-10-27 NOTE — PROGRESS NOTES
"Patient is here to discuss follow up.    /79 (BP Location: Right arm, Patient Position: Chair, Cuff Size: Adult Regular)   Pulse 61   Ht 5' 10\" (1.778 m)   Wt 191 lb 6.4 oz (86.8 kg)   SpO2 98%   BMI 27.46 kg/m      Questions patient would like addressed today are: N/A.    Refills are needed: N/A    Has homecare services and agency name:  [facility]  "

## 2022-10-27 NOTE — PROGRESS NOTES
[facility] INITIAL VASCULAR MEDICINE CONSULT      PRIMARY HEALTH CARE PROVIDER:  Enrique Figueroa      REASON FOR CONSULT:  History of acquired lymphedema RLE.       HPI: Satish Lang is a very pleasant 72 year old male who had previously been followed by Dr. Suzanne Zelaya for acquired lymphedema of the right lower extremity. He has thoracic aortic ectasia, coronary artery calcification, mild aortic insufficiency and a history of prostate cancer treated March 7, 2012 with robotic prostatectomy with bilateral pelvic lymph node dissection per . His right leg swelling began at the end of October 2019 when he awkwardly landed on his right foot and had immediate right Achilles tendon pain caused by rupture of his right Achilles tendon. He had repair in early December 2019 complicated by infection and then worsening swelling. He was sent to Lymphedema Therapy which provided good exercises for him as well as good compression socks. Venous competency testing was negative for venous incompetence and prostate MRI was unremarkable at that time.    He has continued to wear his socks and states his symptoms do generally feel well controlled. He does have some bronze discoloration of some of his skin in both feet, but no pain. He now presents to establish care in our clinic as Dr. Zelaya has since left.       PAST MEDICAL HISTORY  Past Medical History:   Diagnosis Date     Achilles tendon rupture 01/01/2019     Aortic ectasia (H)      Cataracts, bilateral      Hypercholesterolemia      Hypertension      Oral herpes      Prostate cancer (H)      Thoracic aortic ectasia (H)     sees [facility]     Tinnitus        CURRENT MEDICATIONS  amlodipine-olmesartan (TAO) 10-40 mg per tablet, [AMLODIPINE-OLMESARTAN (TAO) 10-40 MG PER TABLET] Take 1 tablet by mouth daily.  atorvastatin (LIPITOR) 40 MG tablet, Take 1 tablet by mouth daily  melatonin 5 MG tablet, Take 5 mg by mouth nightly  as needed for sleep  meloxicam (MOBIC) 7.5 MG tablet, 1 tab(s)  docusate sodium (COLACE) 100 MG capsule, Take 1 capsule (100 mg) by mouth 2 times daily  valACYclovir (VALTREX) 1000 mg tablet, Take 2,000 mg by mouth 2 times daily    No current facility-administered medications on file prior to visit.      PAST SURGICAL HISTORY:  Past Surgical History:   Procedure Laterality Date     DAVINCI XI HERNIORRHAPHY INGUINAL Left 3/31/2022    Procedure: HERNIORRHAPHY, INGUINAL, ROBOT-ASSISTED, LAPAROSCOPIC, USING DA SRINIVAS XI, LEFT;  Surgeon: Donis Alvarado DO;  Location: [facility] [place], [place] OR     OTHER SURGICAL HISTORY  1984    right inguinal hernia repair     PROSTATECTOMY  2012     REPAIR TENDON ACHILLES         ALLERGIES   No Known Allergies    FAMILY HISTORY  Family History   Problem Relation Age of Onset     Hyperlipidemia Mother      Heart Disease Mother      Cancer Mother      Heart Disease Father      Coronary Artery Disease Brother         PTCA with stenting     Coronary Artery Disease Brother         PTCA with stenting     Coronary Artery Disease Sister         PTCA with stenting       SOCIAL HISTORY  Social History     Socioeconomic History     Marital status:      Spouse name: Not on file     Number of children: Not on file     Years of education: Not on file     Highest education level: Not on file   Occupational History     Not on file   Tobacco Use     Smoking status: Never     Smokeless tobacco: Never   Substance and Sexual Activity     Alcohol use: Yes     Comment: Alcoholic Drinks/day: wine 2x/week     Drug use: No     Sexual activity: Not on file   Other Topics Concern     Not on file   Social History Narrative    The patient is employed.        ROS:   General: No change in weight, sleep or appetite.  Normal energy.  No fever or chills  Eyes: Negative for vision changes or eye problems  ENT: No problems with ears, nose or throat.  No difficulty swallowing.  Resp: No coughing, wheezing or shortness of  "breath  CV: No chest pains or palpitations  GI: No nausea, vomiting,  heartburn, abdominal pain, diarrhea, constipation or change in bowel habits  : No urinary frequency or dysuria, bladder or kidney problems  Musculoskeletal: No significant muscle or joint pains  Neurologic: No headaches, numbness, tingling, weakness, problems with balance or coordination  Psychiatric: No problems with anxiety, depression or mental health  Heme/immune/allergy: No history of bleeding or clotting problems or anemia.  No allergies or immune system problems  Endocrine: No history of thyroid disease, diabetes or other endocrine disorders  Skin: No rashes,worrisome lesions or skin problems  Vascular:  No claudication, lifestyle limiting or otherwise; no ischemic rest pain; no non-healing ulcers. No weakness, No loss of sensation     EXAM:  /79 (BP Location: Right arm, Patient Position: Chair, Cuff Size: Adult Regular)   Pulse 61   Ht 1.778 m (5' 10\")   Wt 86.8 kg (191 lb 6.4 oz)   SpO2 98%   BMI 27.46 kg/m    In general, the patient is a pleasant male in no apparent distress.    HEENT: NC/AT.  PERRLA.  EOMI.  Sclerae white, not injected.    Heart: RRR. Normal S1, S2 splits physiologically. No murmur, rub, click, or gallop.   Lungs: CTA.  No ronchi, wheezes, rales.   Abdomen: Soft, nontender, nondistended.   Extremities: No clubbing, cyanosis. Trace edema RLE. Stemmer sign negative. Old and well healed surgical scars on medial foot/ankle noted.  No wounds.       Labs:  LIPID RESULTS:  Lab Results   Component Value Date    CHOL 154 06/07/2022    HDL 64 06/07/2022    LDL 72 06/07/2022    TRIG 90 06/07/2022       LIVER ENZYME RESULTS:  Lab Results   Component Value Date    AST 23 06/07/2022    ALT 27 06/07/2022       BMP RESULTS:  Lab Results   Component Value Date     06/07/2022    POTASSIUM 3.8 06/07/2022    CHLORIDE 108 (H) 06/07/2022    CO2 27 06/07/2022    ANIONGAP 9 06/07/2022    GLC 83 06/07/2022    BUN 18 " 06/07/2022    CR 0.86 06/07/2022    GFRESTIMATED >90 06/07/2022    GFRESTIMATED >60 01/11/2021    GFRESTBLACK >60 01/11/2021    BONI 9.5 06/07/2022        Procedures:   Venous Insufficiency Ultrasound   Bilateral Lower Extremities 7/9/2020     Indication:  Surveillance of bilateral leg pain and swelling     Previous: none     Patient History: Swelling     Presenting Symptoms:  Swelling, Varicose Veins and Pain     Compression Study:  Normal     Comments: Normal study.     Impression:       Right Deep Vein Findings: Patent and without reflux     Left Deep Vein Findings: Patent without reflux     Superficial Vein Findings:      Right Greater Saphenous vein: Patent and without reflux     Right Small Saphenous Vein: Patent and without reflux     Left Greater Saphenous Vein: Patent and without reflux     Left Small Saphenous Vein: Patent and without reflux      EXAM: MRI PELVIS PROSTATE PROTOCOL  LOCATION: [facility]  DATE/TIME: 7/9/2020 8:00 PM     INDICATION: RIGHT LEG SWELLING, HISTORY OF PROSTATE CANCER  COMPARISON: None.     TECHNIQUE: Routine MRI prostate protocol including T1, diffusion, thin section  high resolution T2 and dynamic T1 thin section with IV contrast. Study was  processed using [employer] multiparametric computer-aided detection system to  optimize radiologist interpretation by generating multiplanar and 3D  reconstructions and creating subtraction images from the dynamic contrast data.  CONTRAST: 18ML OF IV DOTAREM WAS ADMINISTERED FROM A SINGLE USE VIAL WITH 2ML  DISCARDED. SPR iSTAT Cr=1.1 mg/dl, eGFR=66. 1mg of Glucagon.  MEDICATIONS: Glucagon 1 mg IV     FINDINGS:  Status post prostatectomy. No residual or recurrent diffusion restricting soft  tissue identified at the vesicourethral junction or near the prostatectomy bed.  No lymphadenopathy. No bone lesions.     There is colonic diverticulosis.     IMPRESSION:  1.  Prostatectomy. No abnormal soft tissue identified.  2.  No  lymphadenopathy.  3.  No bone lesions.      Assessment and Plan:   Acquired right lower extremity lymphedema    -Prior history of lymph node dissection in groin from prostate cancer, presently cancer free.   -Venous competency study in 2020 was negative for venous incompetence.   -Symptoms presently are minimal with consistent use of knee-high compression socks.     Recommendations:   -Continue to wear knee-high compression socks. A new prescription was provided for additional socks.   -If his symptoms ever worsen again, will refer back to Lymphedema Therapy (he would like to hold off on this for now).   -He still remember the exercise Lymphedema Therapy gave him. He was advised to continue doing these.   -Follow-up with Vascular Medicine as needed.       Esperanza Altman PA-C      45 minutes spent on the date of the encounter doing chart review, history and exam, documentation, and further activities as noted above.

## 2022-10-27 NOTE — PATIENT INSTRUCTIONS
Continue to wear compression socks (20-30 mmHg knee high).     2. Continue previous lymphedema exercises. If you ever want to see Lymphedema Therapy, call us and we can place a referral.     3. Return to our clinic as needed.     4. Call us with any questions or concerns ([phone]).

## 2022-11-29 ENCOUNTER — TELEPHONE (OUTPATIENT)
Dept: OTHER | Facility: CLINIC | Age: 72
End: 2022-11-29

## 2022-11-29 NOTE — TELEPHONE ENCOUNTER
Patient has a history of ruptured right achilles tendon with repair in 2019.  I explained from the vascular side it is ok for him to jump rope but he will need to speak with his orthopedic physician if it is safe to jump rope with his ruptured achilles tendon history. He verbalized understanding.     Nirali CHAVEZ RN    [facility]  Vascular [facility]  Office: [phone]  Fax: [phone]

## 2022-11-29 NOTE — TELEPHONE ENCOUNTER
[facility]    Who is the name of the provider?:  Anupama    What is the location you see this provider at/preferred location?: [place]  Person calling: Jean Lang  Phone number:  [phone]  Nurse call back needed:  YES     Reason for call:   Patient is asking if jumping rope is safe exercise considering his history of treatment on ELOY achilies / leg concerns. Please call patient to discuss.    Pharmacy location:  n/a  Outside Imaging: Not Applicable   Can we leave a detailed message on this number?  Not Applicable

## 2023-01-09 ENCOUNTER — TELEPHONE (OUTPATIENT)
Dept: CARDIOLOGY | Facility: CLINIC | Age: 73
End: 2023-01-09

## 2023-01-09 DIAGNOSIS — I35.9 AORTIC VALVE DISEASE: ICD-10-CM

## 2023-01-09 DIAGNOSIS — I35.1 MILD AORTIC INSUFFICIENCY: ICD-10-CM

## 2023-01-09 DIAGNOSIS — I25.10 CORONARY ARTERY CALCIFICATION SEEN ON CAT SCAN: ICD-10-CM

## 2023-01-09 DIAGNOSIS — I77.810 AORTIC ECTASIA, THORACIC (H): Primary | ICD-10-CM

## 2023-01-09 NOTE — TELEPHONE ENCOUNTER
[facility] Call Center    Phone Message    May a detailed message be left on voicemail: yes     Reason for Call: Appointment Intake    Referring Provider Name: Dr Gomes  Diagnosis and/or Symptoms: 2 year follow up and I can make the appt but the Pt stated that he has labs and an echo done and there are no orders. Please have orders added and assist Pt in scheduling this appt.    Action Taken: Other: Cardio    Travel Screening: Not Applicable   Thank you!  [facility]

## 2023-01-10 NOTE — TELEPHONE ENCOUNTER
Dr Austin please review and give any orders needed at this time. Last OV 1/26/2021. Pt due for Q2 year follow-up. CTA of chest and/or echocardiogram? Please advise if needs OV as a first step. Thank you CMM,Rn

## 2023-01-11 NOTE — TELEPHONE ENCOUNTER
===View-only below this line===  ----- Message -----  From: Nayana Gomes MD  Sent: 1/10/2023   4:58 PM CST  To: Gelacio Blanco RN    CTA chest please.  Thanks, EG    Order placed. Will send my chart message to patient. Send staff message to schedulers to call and arrange EMG follow-up and CTA order. CMM,Rn

## 2023-01-31 ENCOUNTER — HOSPITAL ENCOUNTER (OUTPATIENT)
Dept: CT IMAGING | Facility: HOSPITAL | Age: 73
Discharge: HOME | End: 2023-01-31
Attending: INTERNAL MEDICINE | Admitting: INTERNAL MEDICINE
Payer: COMMERCIAL

## 2023-01-31 ENCOUNTER — TELEPHONE (OUTPATIENT)
Dept: CARDIOLOGY | Facility: CLINIC | Age: 73
End: 2023-01-31
Payer: COMMERCIAL

## 2023-01-31 DIAGNOSIS — I35.1 MILD AORTIC INSUFFICIENCY: ICD-10-CM

## 2023-01-31 DIAGNOSIS — I77.810 AORTIC ECTASIA, THORACIC (H): ICD-10-CM

## 2023-01-31 DIAGNOSIS — I35.9 AORTIC VALVE DISEASE: ICD-10-CM

## 2023-01-31 DIAGNOSIS — I25.10 CORONARY ARTERY CALCIFICATION SEEN ON CAT SCAN: ICD-10-CM

## 2023-01-31 LAB
CREAT BLD-MCNC: 1 MG/DL (ref 0.7–1.3)
GFR SERPL CREATININE-BSD FRML MDRD: >60 ML/MIN/1.73M2

## 2023-01-31 PROCEDURE — 82565 ASSAY OF CREATININE: CPT

## 2023-01-31 PROCEDURE — 250N000011 HC RX IP 250 OP 636: Performed by: INTERNAL MEDICINE

## 2023-01-31 PROCEDURE — 71275 CT ANGIOGRAPHY CHEST: CPT

## 2023-01-31 RX ORDER — IOPAMIDOL 755 MG/ML
100 INJECTION, SOLUTION INTRAVASCULAR ONCE
Status: COMPLETED | OUTPATIENT
Start: 2023-01-31 | End: 2023-01-31

## 2023-01-31 RX ADMIN — IOPAMIDOL 100 ML: 755 INJECTION, SOLUTION INTRAVENOUS at 11:18

## 2023-01-31 NOTE — TELEPHONE ENCOUNTER
----- Message from Pati Antoine sent at 1/31/2023 10:08 AM CST -----  Regarding: EMG PATIENT  General phone call:    Caller: KALEB FROM THE [facility]AGING [facility] IN     Primary cardiologist: FREDDY    Detailed reason for call: PATIENT IS SCHEDULED FOR CTA TODAY @ 11:00, HE HAD A CTA DONE 2 WEEKS AGO, WILL HE STILL NEED THIS CTA TODAY? PLEASE CALL KALEB AND ADVISE.    Best phone number: [phone] KALEB    Best time to contact: ANY    Ok to leave a detailedmessage? YES    Device? NO    Additional Info:

## 2023-03-14 ENCOUNTER — TRANSFERRED RECORDS (OUTPATIENT)
Dept: HEALTH INFORMATION MANAGEMENT | Facility: CLINIC | Age: 73
End: 2023-03-14

## 2023-04-24 ENCOUNTER — OFFICE VISIT (OUTPATIENT)
Dept: CARDIOLOGY | Facility: CLINIC | Age: 73
End: 2023-04-24
Payer: COMMERCIAL

## 2023-04-24 VITALS
HEIGHT: 70 IN | WEIGHT: 188 LBS | BODY MASS INDEX: 26.92 KG/M2 | RESPIRATION RATE: 16 BRPM | DIASTOLIC BLOOD PRESSURE: 70 MMHG | HEART RATE: 57 BPM | SYSTOLIC BLOOD PRESSURE: 116 MMHG

## 2023-04-24 DIAGNOSIS — I77.810 ACQUIRED DILATION OF ASCENDING AORTA AND AORTIC ROOT (H): Primary | ICD-10-CM

## 2023-04-24 PROCEDURE — 99214 OFFICE O/P EST MOD 30 MIN: CPT | Performed by: INTERNAL MEDICINE

## 2023-04-24 RX ORDER — HYDROXYZINE PAMOATE 50 MG/1
CAPSULE ORAL
COMMUNITY
Start: 2023-04-10

## 2023-04-24 NOTE — PROGRESS NOTES
HEART CARE ENCOUNTER CONSULTATON NOTE      [facility]  [phone]      Assessment/Recommendations   Assessment/Plan:  Aortic ectasia - grossly stable by serial CT angiograms (see HPI below). Plan repeat study in 3 years to try to lower his radiation exposure    Coronary calcification - on high intensity statin.  He should be taking an aspirin 81mg daily.     Hyperlipidemia - LDL creeping up. Diet and exercise discussed.    Hypertension - at goal on medication.     F/U 3 years with repeat CTA at that time.  Consider spacing out to 5 years pending that result.       History of Present Illness/Subjective    HPI: Satish Lang is a 73 year old male with an extensive family history of CAD without warning symptoms and a personal history of thoracic aortic ectasia. In 2014 he had a coronary CTA with a calcium score in the 600s, which put him in the 75-90% risk group for 10 years. The calcium was primarily in the mid LAD. He also had an echo consistent with moderate RV dysfunction in the setting of chest pain in 2014 but the repeat echo 2015 was read as normal with mild AI. Stress echo 11/2016 was 11:00 with EKG strain but no wall motion abnormalities. CTA chest in serial has shown a slow growing ascending aortic ectasia/aneurysm that was 4.5 cm fall 2018 but measured 4.2 on the December 2019 images. CTA 1/2021 was measured at 4.4 x 4.4 cm. Echo 1/2021 EF was 64% with mild TR.      Jean returns for 2 year follow up today. He remains active skiing in the winter and going to the gym. He did have some dyspnea skiing in the [facility] this winter but felt fine back in [place]. There has been no chest pain, dizziness, palpitations, pnd/orthopnea, edema, syncope or near syncope.     CTA chest 1/2023:  1.  Ascending thoracic aorta measures up to 4.4 x 4.4 cm, previously 4.3 x 4.3 cm. Difficult to visualize the aortic root due to motion artifact. No acute thoracic aortic pathology.  2.  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 Was present to variable degree on previous exam.  3.  Cholelithiasis, without complicating features.  4.  Expansile right second rib lesion, unchanged. This may be fibrous dysplasia or other nonspecific bone etiology.  5.  Suspect hemangioma of T12 vertebral body, unchanged.       Physical Examination  Review of Systems   Vitals: /70 (BP Location: Right arm, Patient Position: Sitting, Cuff Size: Adult Regular)   Pulse 57   Resp 16   Ht 1.778 m (5' 10\")   Wt 85.3 kg (188 lb)   BMI 26.98 kg/m    BMI= Body mass index is 26.98 kg/m .  Wt Readings from Last 3 Encounters:   04/24/23 85.3 kg (188 lb)   10/27/22 86.8 kg (191 lb 6.4 oz)   03/31/22 85.1 kg (187 lb 11.2 oz)       General Appearance:   no distress, normal body habitus   ENT/Mouth: membranes moist, no oral lesions or bleeding gums.      EYES:  no scleral icterus, normal conjunctivae   Neck: no carotid bruits or thyromegaly   Chest/Lungs:   lungs are clear to auscultation, no rales or wheezing, no sternal scar, equal chest wall expansion    Cardiovascular:   Regular. Normal first and second heart sounds with faint early systolic murmur. No rubs or gallops; the right carotid, radial and posterior tibial pulses are intact and the left carotid, radial and posterior tibial pulses are intact.  Jugular venous pressure is flat, no edema bilaterally    Abdomen:  no organomegaly, masses, bruits, or tenderness; bowel sounds are present   Extremities: no cyanosis or clubbing   Skin: no xanthelasma, warm.    Neurologic: normal  bilateral, no tremors     Psychiatric: alert and oriented x3, calm        Please refer above for cardiac ROS details.        Medical History  Surgical History Family History Social History   Past Medical History:   Diagnosis Date     Achilles tendon rupture 01/01/2019     Aortic ectasia (H)      Cataracts, bilateral      Hypercholesterolemia      " Hypertension      Oral herpes      Prostate cancer (H)      Thoracic aortic ectasia (H)     sees [facility]     Tinnitus      Past Surgical History:   Procedure Laterality Date     DAVINCI XI HERNIORRHAPHY INGUINAL Left 3/31/2022    Procedure: HERNIORRHAPHY, INGUINAL, ROBOT-ASSISTED, LAPAROSCOPIC, USING DA SRINIVAS XI, LEFT;  Surgeon: Donis Alvarado DO;  Location: [facility] Main OR     OTHER SURGICAL HISTORY  1984    right inguinal hernia repair     PROSTATECTOMY  2012     REPAIR TENDON ACHILLES       Family History   Problem Relation Age of Onset     Hyperlipidemia Mother      Heart Disease Mother      Cancer Mother      Heart Disease Father      Coronary Artery Disease Brother         PTCA with stenting     Coronary Artery Disease Brother         PTCA with stenting     Coronary Artery Disease Sister         PTCA with stenting        Social History     Socioeconomic History     Marital status:      Spouse name: Not on file     Number of children: Not on file     Years of education: Not on file     Highest education level: Not on file   Occupational History     Not on file   Tobacco Use     Smoking status: Never     Smokeless tobacco: Never   Vaping Use     Vaping status: Not on file   Substance and Sexual Activity     Alcohol use: Yes     Comment: Alcoholic Drinks/day: wine 2x/week     Drug use: No     Sexual activity: Not on file   Other Topics Concern     Not on file   Social History Narrative    The patient is employed.      Social Determinants of Health     Financial Resource Strain: Not on file   Food Insecurity: Not on file   Transportation Needs: Not on file   Physical Activity: Not on file   Stress: Not on file   Social Connections: Not on file   Intimate Partner Violence: Not on file   Housing Stability: Not on file           Medications  Allergies   Current Outpatient Medications   Medication Sig Dispense Refill     amlodipine-olmesartan (TAO) 10-40 mg per tablet [AMLODIPINE-OLMESARTAN (TAO) 10-40  MG PER TABLET] Take 1 tablet by mouth daily.       atorvastatin (LIPITOR) 40 MG tablet Take 1 tablet by mouth daily       hydrOXYzine (VISTARIL) 50 MG capsule take 1 capsule by mouth every day at bedtime as needed       meloxicam (MOBIC) 7.5 MG tablet 1 tab(s)       melatonin 5 MG tablet Take 5 mg by mouth nightly as needed for sleep (Patient not taking: Reported on 4/24/2023)       No Known Allergies       Lab Results    Chemistry/lipid CBC Cardiac Enzymes/BNP/TSH/INR   Recent Labs   Lab Test 06/07/22  1225   CHOL 154   HDL 64   LDL 72   TRIG 90     Recent Labs   Lab Test 06/07/22  1225 08/02/21  1534 01/26/21  1335   LDL 72 63 48     Recent Labs   Lab Test 01/31/23  1102 06/07/22  1225   NA  --  144   POTASSIUM  --  3.8   CHLORIDE  --  108*   CO2  --  27   GLC  --  83   BUN  --  18   CR 1.0 0.86   GFRESTIMATED >60 >90   BONI  --  9.5     Recent Labs   Lab Test 01/31/23  1102 06/07/22  1225 08/02/21  1534   CR 1.0 0.86 0.86     No results for input(s): A1C in the last 70202 hours.       No results for input(s): WBC, HGB, HCT, MCV, PLT in the last 74394 hours.  No results for input(s): HGB in the last 05266 hours. No results for input(s): TROPONINI in the last 14078 hours.  No results for input(s): BNP, NTBNPI, NTBNP in the last 56021 hours.  No results for input(s): TSH in the last 14749 hours.  No results for input(s): INR in the last 26030 hours.     Today's clinic visit entailed:  Review of prior external note(s) from - [facility] information from epic reviewed  40 minutes spent by me on the date of the encounter doing chart review, review of outside records, review of test results, interpretation of tests, patient visit and documentation   Provider  Link to [facility] Help Grid     The level of medical decision making during this visit was of moderate complexity.        Nayana Gomes MD

## 2023-04-24 NOTE — LETTER
4/24/2023    Enrique Figueroa MD  [facility] [address] [facility] [address]    RE: Satish JIMENEZ Felt       Dear Colleague,     I had the pleasure of seeing Satish Lang in the [facility].    HEART CARE ENCOUNTER CONSULTATON NOTE      [facility]  [phone]      Assessment/Recommendations   Assessment/Plan:  Aortic ectasia - grossly stable by serial CT angiograms (see HPI below). Plan repeat study in 3 years to try to lower his radiation exposure    Coronary calcification - on high intensity statin.  He should be taking an aspirin 81mg daily.     Hyperlipidemia - LDL creeping up. Diet and exercise discussed.    Hypertension - at goal on medication.     F/U 3 years with repeat CTA at that time.  Consider spacing out to 5 years pending that result.       History of Present Illness/Subjective    HPI: Satish Lang is a 73 year old male with an extensive family history of CAD without warning symptoms and a personal history of thoracic aortic ectasia. In 2014 he had a coronary CTA with a calcium score in the 600s, which put him in the 75-90% risk group for 10 years. The calcium was primarily in the mid LAD. He also had an echo consistent with moderate RV dysfunction in the setting of chest pain in 2014 but the repeat echo 2015 was read as normal with mild AI. Stress echo 11/2016 was 11:00 with EKG strain but no wall motion abnormalities. CTA chest in serial has shown a slow growing ascending aortic ectasia/aneurysm that was 4.5 cm fall 2018 but measured 4.2 on the December 2019 images. CTA 1/2021 was measured at 4.4 x 4.4 cm. Echo 1/2021 EF was 64% with mild TR.      Jean returns for 2 year follow up today. He remains active skiing in the winter and going to the gym. He did have some dyspnea skiing in the [facility] this winter but felt fine back in [place]. There has been no chest pain, dizziness, palpitations, pnd/orthopnea, edema, syncope  "or near syncope.     CTA chest 1/2023:  1.  Ascending thoracic aorta measures up to 4.4 x 4.4 cm, previously 4.3 x 4.3 cm. Difficult to visualize the aortic root due to motion artifact. No acute thoracic aortic pathology.  2.  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 Was present to variable degree on previous exam.  3.  Cholelithiasis, without complicating features.  4.  Expansile right second rib lesion, unchanged. This may be fibrous dysplasia or other nonspecific bone etiology.  5.  Suspect hemangioma of T12 vertebral body, unchanged.       Physical Examination  Review of Systems   Vitals: /70 (BP Location: Right arm, Patient Position: Sitting, Cuff Size: Adult Regular)   Pulse 57   Resp 16   Ht 1.778 m (5' 10\")   Wt 85.3 kg (188 lb)   BMI 26.98 kg/m    BMI= Body mass index is 26.98 kg/m .  Wt Readings from Last 3 Encounters:   04/24/23 85.3 kg (188 lb)   10/27/22 86.8 kg (191 lb 6.4 oz)   03/31/22 85.1 kg (187 lb 11.2 oz)       General Appearance:   no distress, normal body habitus   ENT/Mouth: membranes moist, no oral lesions or bleeding gums.      EYES:  no scleral icterus, normal conjunctivae   Neck: no carotid bruits or thyromegaly   Chest/Lungs:   lungs are clear to auscultation, no rales or wheezing, no sternal scar, equal chest wall expansion    Cardiovascular:   Regular. Normal first and second heart sounds with faint early systolic murmur. No rubs or gallops; the right carotid, radial and posterior tibial pulses are intact and the left carotid, radial and posterior tibial pulses are intact.  Jugular venous pressure is flat, no edema bilaterally    Abdomen:  no organomegaly, masses, bruits, or tenderness; bowel sounds are present   Extremities: no cyanosis or clubbing   Skin: no xanthelasma, warm.    Neurologic: normal  bilateral, no tremors     Psychiatric: alert and oriented x3, calm        Please refer above for cardiac ROS " details.        Medical History  Surgical History Family History Social History   Past Medical History:   Diagnosis Date    Achilles tendon rupture 01/01/2019    Aortic ectasia (H)     Cataracts, bilateral     Hypercholesterolemia     Hypertension     Oral herpes     Prostate cancer (H)     Thoracic aortic ectasia (H)     sees [facility]    Tinnitus      Past Surgical History:   Procedure Laterality Date    DAVINCI XI HERNIORRHAPHY INGUINAL Left 3/31/2022    Procedure: HERNIORRHAPHY, INGUINAL, ROBOT-ASSISTED, LAPAROSCOPIC, USING DA SRINIVAS XI, LEFT;  Surgeon: Donis Alvarado DO;  Location: [facility] Main OR    OTHER SURGICAL HISTORY  1984    right inguinal hernia repair    PROSTATECTOMY  2012    REPAIR TENDON ACHILLES       Family History   Problem Relation Age of Onset    Hyperlipidemia Mother     Heart Disease Mother     Cancer Mother     Heart Disease Father     Coronary Artery Disease Brother         PTCA with stenting    Coronary Artery Disease Brother         PTCA with stenting    Coronary Artery Disease Sister         PTCA with stenting        Social History     Socioeconomic History    Marital status:      Spouse name: Not on file    Number of children: Not on file    Years of education: Not on file    Highest education level: Not on file   Occupational History    Not on file   Tobacco Use    Smoking status: Never    Smokeless tobacco: Never   Vaping Use    Vaping status: Not on file   Substance and Sexual Activity    Alcohol use: Yes     Comment: Alcoholic Drinks/day: wine 2x/week    Drug use: No    Sexual activity: Not on file   Other Topics Concern    Not on file   Social History Narrative    The patient is employed.      Social Determinants of Health     Financial Resource Strain: Not on file   Food Insecurity: Not on file   Transportation Needs: Not on file   Physical Activity: Not on file   Stress: Not on file   Social Connections: Not on file   Intimate Partner Violence: Not on file   Housing  Stability: Not on file           Medications  Allergies   Current Outpatient Medications   Medication Sig Dispense Refill    amlodipine-olmesartan (TAO) 10-40 mg per tablet [AMLODIPINE-OLMESARTAN (TAO) 10-40 MG PER TABLET] Take 1 tablet by mouth daily.      atorvastatin (LIPITOR) 40 MG tablet Take 1 tablet by mouth daily      hydrOXYzine (VISTARIL) 50 MG capsule take 1 capsule by mouth every day at bedtime as needed      meloxicam (MOBIC) 7.5 MG tablet 1 tab(s)      melatonin 5 MG tablet Take 5 mg by mouth nightly as needed for sleep (Patient not taking: Reported on 4/24/2023)       No Known Allergies       Lab Results    Chemistry/lipid CBC Cardiac Enzymes/BNP/TSH/INR   Recent Labs   Lab Test 06/07/22  1225   CHOL 154   HDL 64   LDL 72   TRIG 90     Recent Labs   Lab Test 06/07/22  1225 08/02/21  1534 01/26/21  1335   LDL 72 63 48     Recent Labs   Lab Test 01/31/23  1102 06/07/22  1225   NA  --  144   POTASSIUM  --  3.8   CHLORIDE  --  108*   CO2  --  27   GLC  --  83   BUN  --  18   CR 1.0 0.86   GFRESTIMATED >60 >90   BONI  --  9.5     Recent Labs   Lab Test 01/31/23  1102 06/07/22  1225 08/02/21  1534   CR 1.0 0.86 0.86     No results for input(s): A1C in the last 23517 hours.       No results for input(s): WBC, HGB, HCT, MCV, PLT in the last 69909 hours.  No results for input(s): HGB in the last 31906 hours. No results for input(s): TROPONINI in the last 79881 hours.  No results for input(s): BNP, NTBNPI, NTBNP in the last 83658 hours.  No results for input(s): TSH in the last 92619 hours.  No results for input(s): INR in the last 09360 hours.     Today's clinic visit entailed:  Review of prior external note(s) from - [facility] information from epic reviewed  40 minutes spent by me on the date of the encounter doing chart review, review of outside records, review of test results, interpretation of tests, patient visit and documentation   Provider  Link to [facility] Help Grid     The level of medical decision  making during this visit was of moderate complexity.        Nayana Gomes MD              Thank you for allowing me to participate in the care of your patient.      Sincerely,     Nayana Gomes MD     [facility]  cc:   No referring provider defined for this encounter.

## 2023-04-24 NOTE — PATIENT INSTRUCTIONS
It was a pleasure seeing you at [facility] today.        Here are my suggestions for your care:    1. Work on exercise and a low fat/low salt/low sugar diet to get the cholesterol down. Goal LDL < 70     2.  Repeat CT scan in 3 years      Let's meet again in 36 months.    You can always call my nurse Taylor Blanco RN who is a nurse helping me in the care of my patients. She can be reached at [phone] if you have any questions.    For scheduling, please call my  Lazara Coburn at [phone].    Thank you again for trusting me with your care. Please feel free to call my office at any time if you have any question or if I can assist you in any way.    Nayana Gomes MD  [facility]

## 2023-06-27 ENCOUNTER — LAB REQUISITION (OUTPATIENT)
Dept: LAB | Facility: CLINIC | Age: 73
End: 2023-06-27

## 2023-06-27 DIAGNOSIS — E78.5 HYPERLIPIDEMIA, UNSPECIFIED: ICD-10-CM

## 2023-06-27 DIAGNOSIS — I10 ESSENTIAL (PRIMARY) HYPERTENSION: ICD-10-CM

## 2023-06-27 DIAGNOSIS — Z11.59 ENCOUNTER FOR SCREENING FOR OTHER VIRAL DISEASES: ICD-10-CM

## 2023-06-27 DIAGNOSIS — Z85.46 PERSONAL HISTORY OF MALIGNANT NEOPLASM OF PROSTATE: ICD-10-CM

## 2023-06-27 PROCEDURE — 80061 LIPID PANEL: CPT | Performed by: PHYSICIAN ASSISTANT

## 2023-06-27 PROCEDURE — 80053 COMPREHEN METABOLIC PANEL: CPT | Performed by: PHYSICIAN ASSISTANT

## 2023-06-27 PROCEDURE — 84153 ASSAY OF PSA TOTAL: CPT | Performed by: PHYSICIAN ASSISTANT

## 2023-06-27 PROCEDURE — 86803 HEPATITIS C AB TEST: CPT | Performed by: PHYSICIAN ASSISTANT

## 2023-06-28 LAB
ALBUMIN SERPL BCG-MCNC: 4.5 G/DL (ref 3.5–5.2)
ALP SERPL-CCNC: 76 U/L (ref 40–129)
ALT SERPL W P-5'-P-CCNC: 33 U/L (ref 0–70)
ANION GAP SERPL CALCULATED.3IONS-SCNC: 12 MMOL/L (ref 7–15)
AST SERPL W P-5'-P-CCNC: 31 U/L (ref 0–45)
BILIRUB SERPL-MCNC: 0.5 MG/DL
BUN SERPL-MCNC: 19.7 MG/DL (ref 8–23)
CALCIUM SERPL-MCNC: 9.5 MG/DL (ref 8.8–10.2)
CHLORIDE SERPL-SCNC: 105 MMOL/L (ref 98–107)
CHOLEST SERPL-MCNC: 157 MG/DL
CREAT SERPL-MCNC: 1.08 MG/DL (ref 0.67–1.17)
DEPRECATED HCO3 PLAS-SCNC: 26 MMOL/L (ref 22–29)
GFR SERPL CREATININE-BSD FRML MDRD: 72 ML/MIN/1.73M2
GLUCOSE SERPL-MCNC: 86 MG/DL (ref 70–99)
HCV AB SERPL QL IA: NONREACTIVE
HDLC SERPL-MCNC: 72 MG/DL
LDLC SERPL CALC-MCNC: 58 MG/DL
NONHDLC SERPL-MCNC: 85 MG/DL
POTASSIUM SERPL-SCNC: 4 MMOL/L (ref 3.4–5.3)
PROT SERPL-MCNC: 6.4 G/DL (ref 6.4–8.3)
PSA SERPL DL<=0.01 NG/ML-MCNC: <0.01 NG/ML (ref 0–6.5)
SODIUM SERPL-SCNC: 143 MMOL/L (ref 136–145)
TRIGL SERPL-MCNC: 134 MG/DL

## 2023-08-05 ENCOUNTER — HEALTH MAINTENANCE LETTER (OUTPATIENT)
Age: 73
End: 2023-08-05

## 2024-06-04 ENCOUNTER — LAB REQUISITION (OUTPATIENT)
Dept: LAB | Facility: CLINIC | Age: 74
End: 2024-06-04

## 2024-06-04 DIAGNOSIS — Z85.46 PERSONAL HISTORY OF MALIGNANT NEOPLASM OF PROSTATE: ICD-10-CM

## 2024-06-04 DIAGNOSIS — E78.5 HYPERLIPIDEMIA, UNSPECIFIED: ICD-10-CM

## 2024-06-04 PROCEDURE — 80053 COMPREHEN METABOLIC PANEL: CPT | Performed by: PHYSICIAN ASSISTANT

## 2024-06-04 PROCEDURE — 84153 ASSAY OF PSA TOTAL: CPT | Performed by: PHYSICIAN ASSISTANT

## 2024-06-04 PROCEDURE — 80061 LIPID PANEL: CPT | Performed by: PHYSICIAN ASSISTANT

## 2024-06-05 LAB
ALBUMIN SERPL BCG-MCNC: 4.4 G/DL (ref 3.5–5.2)
ALP SERPL-CCNC: 71 U/L (ref 40–150)
ALT SERPL W P-5'-P-CCNC: 33 U/L (ref 0–70)
ANION GAP SERPL CALCULATED.3IONS-SCNC: 13 MMOL/L (ref 7–15)
AST SERPL W P-5'-P-CCNC: 30 U/L (ref 0–45)
BILIRUB SERPL-MCNC: 0.9 MG/DL
BUN SERPL-MCNC: 15.3 MG/DL (ref 8–23)
CALCIUM SERPL-MCNC: 8.6 MG/DL (ref 8.8–10.2)
CHLORIDE SERPL-SCNC: 106 MMOL/L (ref 98–107)
CHOLEST SERPL-MCNC: 136 MG/DL
CREAT SERPL-MCNC: 1.04 MG/DL (ref 0.67–1.17)
DEPRECATED HCO3 PLAS-SCNC: 22 MMOL/L (ref 22–29)
EGFRCR SERPLBLD CKD-EPI 2021: 75 ML/MIN/1.73M2
FASTING STATUS PATIENT QL REPORTED: ABNORMAL
FASTING STATUS PATIENT QL REPORTED: NORMAL
GLUCOSE SERPL-MCNC: 156 MG/DL (ref 70–99)
HDLC SERPL-MCNC: 59 MG/DL
LDLC SERPL CALC-MCNC: 54 MG/DL
NONHDLC SERPL-MCNC: 77 MG/DL
POTASSIUM SERPL-SCNC: 3.2 MMOL/L (ref 3.4–5.3)
PROT SERPL-MCNC: 6.5 G/DL (ref 6.4–8.3)
PSA SERPL DL<=0.01 NG/ML-MCNC: <0.01 NG/ML (ref 0–6.5)
SODIUM SERPL-SCNC: 141 MMOL/L (ref 135–145)
TRIGL SERPL-MCNC: 117 MG/DL

## 2024-09-28 ENCOUNTER — HEALTH MAINTENANCE LETTER (OUTPATIENT)
Age: 74
End: 2024-09-28

## 2025-08-01 ENCOUNTER — LAB REQUISITION (OUTPATIENT)
Dept: LAB | Facility: CLINIC | Age: 75
End: 2025-08-01
Payer: COMMERCIAL

## 2025-08-01 DIAGNOSIS — Z85.46 PERSONAL HISTORY OF MALIGNANT NEOPLASM OF PROSTATE: ICD-10-CM

## 2025-08-01 DIAGNOSIS — I10 ESSENTIAL (PRIMARY) HYPERTENSION: ICD-10-CM

## 2025-08-01 DIAGNOSIS — I25.10 ATHEROSCLEROTIC HEART DISEASE OF NATIVE CORONARY ARTERY WITHOUT ANGINA PECTORIS: ICD-10-CM

## 2025-08-01 LAB
ALBUMIN SERPL BCG-MCNC: 4.1 G/DL (ref 3.5–5.2)
ALP SERPL-CCNC: 78 U/L (ref 40–150)
ALT SERPL W P-5'-P-CCNC: 33 U/L (ref 0–70)
ANION GAP SERPL CALCULATED.3IONS-SCNC: 9 MMOL/L (ref 7–15)
AST SERPL W P-5'-P-CCNC: 32 U/L (ref 0–45)
BILIRUB SERPL-MCNC: 1.4 MG/DL
BUN SERPL-MCNC: 14.4 MG/DL (ref 8–23)
CALCIUM SERPL-MCNC: 8.5 MG/DL (ref 8.8–10.4)
CHLORIDE SERPL-SCNC: 104 MMOL/L (ref 98–107)
CHOLEST SERPL-MCNC: 139 MG/DL
CREAT SERPL-MCNC: 1.02 MG/DL (ref 0.67–1.17)
EGFRCR SERPLBLD CKD-EPI 2021: 76 ML/MIN/1.73M2
FASTING STATUS PATIENT QL REPORTED: YES
FASTING STATUS PATIENT QL REPORTED: YES
GLUCOSE SERPL-MCNC: 91 MG/DL (ref 70–99)
HCO3 SERPL-SCNC: 26 MMOL/L (ref 22–29)
HDLC SERPL-MCNC: 63 MG/DL
LDLC SERPL CALC-MCNC: 58 MG/DL
NONHDLC SERPL-MCNC: 76 MG/DL
POTASSIUM SERPL-SCNC: 3.5 MMOL/L (ref 3.4–5.3)
PROT SERPL-MCNC: 6.3 G/DL (ref 6.4–8.3)
PSA SERPL DL<=0.01 NG/ML-MCNC: <0.01 NG/ML (ref 0–6.5)
SODIUM SERPL-SCNC: 139 MMOL/L (ref 135–145)
TRIGL SERPL-MCNC: 88 MG/DL

## 2025-08-01 PROCEDURE — 80053 COMPREHEN METABOLIC PANEL: CPT | Mod: ORL | Performed by: STUDENT IN AN ORGANIZED HEALTH CARE EDUCATION/TRAINING PROGRAM

## 2025-08-01 PROCEDURE — 80061 LIPID PANEL: CPT | Mod: ORL | Performed by: STUDENT IN AN ORGANIZED HEALTH CARE EDUCATION/TRAINING PROGRAM

## 2025-08-01 PROCEDURE — 84153 ASSAY OF PSA TOTAL: CPT | Mod: ORL | Performed by: STUDENT IN AN ORGANIZED HEALTH CARE EDUCATION/TRAINING PROGRAM

## (undated) DEVICE — ADH SKIN CLOSURE PREMIERPRO EXOFIN 1.0ML 3470

## (undated) DEVICE — SU VICRYL 4-0 PS-2 18" UND J496H

## (undated) DEVICE — DRAPE SHEET TABLE COVER KC 42301*

## (undated) DEVICE — GLOVE UNDER INDICATOR PI SZ 7.0 LF 41670

## (undated) DEVICE — NDL INSUFFLATION 13GA 120MM C2201

## (undated) DEVICE — CUSTOM PACK LAP CHOLE SBA5BLCHEA

## (undated) DEVICE — PROTECTOR ARM STANDARD ONE STEP

## (undated) DEVICE — SYR 50ML SLIP TIP W/O NDL 309654

## (undated) DEVICE — DAVINCI XI DRAPE COLUMN 470341

## (undated) DEVICE — DAVINCI XI SEAL UNIVERSAL 5-8MM 470361

## (undated) DEVICE — DECANTER VIAL 2006S

## (undated) DEVICE — BLADE KNIFE SURG 11 371111

## (undated) DEVICE — MARKER SURG SKIN STRL 77734

## (undated) DEVICE — LUBRICANT INST ELECTROLUBE EL101

## (undated) DEVICE — DAVINCI HOT SHEARS TIP COVER  400180

## (undated) DEVICE — DAVINCI XI OBTURATOR BLADELESS 8MM 470359

## (undated) DEVICE — SU VICRYL+ 3-0 27IN SH UND VCP416H

## (undated) DEVICE — TUBING SMOKE EVAC PNEUMOCLEAR HIGH FLOW 0620050250

## (undated) DEVICE — DRAPE SHEET REV FOLD 3/4 9349

## (undated) DEVICE — DRAPE SURG SMS HALF L58 IN X W40 IN STRL 47614

## (undated) DEVICE — SUTURE VICRYL+ 2-0 27IN SH UND VCP417H

## (undated) DEVICE — DAVINCI XI DRAPE ARM 470015

## (undated) DEVICE — DRAPE U SPLIT 74X120" 29440

## (undated) DEVICE — PREP CHLORAPREP 26ML TINTED HI-LITE ORANGE 930815

## (undated) RX ORDER — FENTANYL CITRATE-0.9 % NACL/PF 10 MCG/ML
PLASTIC BAG, INJECTION (ML) INTRAVENOUS
Status: DISPENSED
Start: 2022-03-31

## (undated) RX ORDER — PROPOFOL 10 MG/ML
INJECTION, EMULSION INTRAVENOUS
Status: DISPENSED
Start: 2022-03-31

## (undated) RX ORDER — FENTANYL CITRATE 50 UG/ML
INJECTION, SOLUTION INTRAMUSCULAR; INTRAVENOUS
Status: DISPENSED
Start: 2022-03-31

## (undated) RX ORDER — LIDOCAINE HYDROCHLORIDE AND EPINEPHRINE 10; 10 MG/ML; UG/ML
INJECTION, SOLUTION INFILTRATION; PERINEURAL
Status: DISPENSED
Start: 2022-03-31

## (undated) RX ORDER — KETAMINE HYDROCHLORIDE 10 MG/ML
INJECTION INTRAMUSCULAR; INTRAVENOUS
Status: DISPENSED
Start: 2022-03-31

## (undated) RX ORDER — DEXAMETHASONE SODIUM PHOSPHATE 10 MG/ML
INJECTION INTRAMUSCULAR; INTRAVENOUS
Status: DISPENSED
Start: 2022-03-31

## (undated) RX ORDER — ONDANSETRON 2 MG/ML
INJECTION INTRAMUSCULAR; INTRAVENOUS
Status: DISPENSED
Start: 2022-03-31